# Patient Record
Sex: FEMALE | Race: WHITE | NOT HISPANIC OR LATINO | Employment: OTHER | URBAN - METROPOLITAN AREA
[De-identification: names, ages, dates, MRNs, and addresses within clinical notes are randomized per-mention and may not be internally consistent; named-entity substitution may affect disease eponyms.]

---

## 2022-08-12 ENCOUNTER — APPOINTMENT (OUTPATIENT)
Dept: CT IMAGING | Facility: HOSPITAL | Age: 32
End: 2022-08-12
Attending: EMERGENCY MEDICINE
Payer: MEDICAID

## 2022-08-12 ENCOUNTER — HOSPITAL ENCOUNTER (OUTPATIENT)
Facility: HOSPITAL | Age: 32
Setting detail: OBSERVATION
Discharge: HOME | End: 2022-08-13
Attending: EMERGENCY MEDICINE | Admitting: INTERNAL MEDICINE
Payer: MEDICAID

## 2022-08-12 ENCOUNTER — APPOINTMENT (OUTPATIENT)
Dept: MRI IMAGING | Facility: HOSPITAL | Age: 32
End: 2022-08-12
Payer: MEDICAID

## 2022-08-12 DIAGNOSIS — R53.1 WEAKNESS: ICD-10-CM

## 2022-08-12 DIAGNOSIS — U07.1 INFECTION DUE TO 2019 NOVEL CORONAVIRUS: ICD-10-CM

## 2022-08-12 DIAGNOSIS — R56.9 SEIZURES (H): ICD-10-CM

## 2022-08-12 DIAGNOSIS — R40.4 UNRESPONSIVE EPISODE: ICD-10-CM

## 2022-08-12 LAB
ALBUMIN SERPL-MCNC: 4 G/DL (ref 3.5–5)
ALP SERPL-CCNC: 86 U/L (ref 45–120)
ALT SERPL W P-5'-P-CCNC: 30 U/L (ref 0–45)
ANION GAP SERPL CALCULATED.3IONS-SCNC: 6 MMOL/L (ref 5–18)
APTT PPP: 27 SECONDS (ref 22–38)
AST SERPL W P-5'-P-CCNC: 23 U/L (ref 0–40)
BASOPHILS # BLD AUTO: 0 10E3/UL (ref 0–0.2)
BASOPHILS NFR BLD AUTO: 1 %
BILIRUB SERPL-MCNC: 1 MG/DL (ref 0–1)
BUN SERPL-MCNC: 15 MG/DL (ref 8–22)
CALCIUM SERPL-MCNC: 9 MG/DL (ref 8.5–10.5)
CHLORIDE BLD-SCNC: 106 MMOL/L (ref 98–107)
CO2 SERPL-SCNC: 26 MMOL/L (ref 22–31)
CREAT SERPL-MCNC: 0.77 MG/DL (ref 0.6–1.1)
EOSINOPHIL # BLD AUTO: 0 10E3/UL (ref 0–0.7)
EOSINOPHIL NFR BLD AUTO: 1 %
ERYTHROCYTE [DISTWIDTH] IN BLOOD BY AUTOMATED COUNT: 11.4 % (ref 10–15)
FLUAV RNA SPEC QL NAA+PROBE: NEGATIVE
FLUBV RNA RESP QL NAA+PROBE: NEGATIVE
GFR SERPL CREATININE-BSD FRML MDRD: >90 ML/MIN/1.73M2
GLUCOSE BLD-MCNC: 93 MG/DL (ref 70–125)
HCG SERPL QL: NEGATIVE
HCT VFR BLD AUTO: 38.8 % (ref 35–47)
HGB BLD-MCNC: 13.3 G/DL (ref 11.7–15.7)
IMM GRANULOCYTES # BLD: 0 10E3/UL
IMM GRANULOCYTES NFR BLD: 0 %
INR PPP: 1.09 (ref 0.85–1.15)
LYMPHOCYTES # BLD AUTO: 0.4 10E3/UL (ref 0.8–5.3)
LYMPHOCYTES NFR BLD AUTO: 10 %
MAGNESIUM SERPL-MCNC: 2 MG/DL (ref 1.8–2.6)
MCH RBC QN AUTO: 30 PG (ref 26.5–33)
MCHC RBC AUTO-ENTMCNC: 34.3 G/DL (ref 31.5–36.5)
MCV RBC AUTO: 87 FL (ref 78–100)
MONOCYTES # BLD AUTO: 0.4 10E3/UL (ref 0–1.3)
MONOCYTES NFR BLD AUTO: 9 %
NEUTROPHILS # BLD AUTO: 3.4 10E3/UL (ref 1.6–8.3)
NEUTROPHILS NFR BLD AUTO: 79 %
NRBC # BLD AUTO: 0 10E3/UL
NRBC BLD AUTO-RTO: 0 /100
PLATELET # BLD AUTO: 191 10E3/UL (ref 150–450)
POTASSIUM BLD-SCNC: 3.8 MMOL/L (ref 3.5–5)
PROT SERPL-MCNC: 7 G/DL (ref 6–8)
RBC # BLD AUTO: 4.44 10E6/UL (ref 3.8–5.2)
RSV RNA SPEC NAA+PROBE: NEGATIVE
SARS-COV-2 RNA RESP QL NAA+PROBE: POSITIVE
SODIUM SERPL-SCNC: 138 MMOL/L (ref 136–145)
TROPONIN I SERPL-MCNC: <0.01 NG/ML (ref 0–0.29)
TSH SERPL DL<=0.005 MIU/L-ACNC: 2.6 UIU/ML (ref 0.3–5)
WBC # BLD AUTO: 4.2 10E3/UL (ref 4–11)

## 2022-08-12 PROCEDURE — 99207 PR NO CHARGE LOS: CPT | Performed by: STUDENT IN AN ORGANIZED HEALTH CARE EDUCATION/TRAINING PROGRAM

## 2022-08-12 PROCEDURE — G0378 HOSPITAL OBSERVATION PER HR: HCPCS

## 2022-08-12 PROCEDURE — 99245 OFF/OP CONSLTJ NEW/EST HI 55: CPT | Performed by: PSYCHIATRY & NEUROLOGY

## 2022-08-12 PROCEDURE — 96361 HYDRATE IV INFUSION ADD-ON: CPT

## 2022-08-12 PROCEDURE — 96365 THER/PROPH/DIAG IV INF INIT: CPT

## 2022-08-12 PROCEDURE — 250N000013 HC RX MED GY IP 250 OP 250 PS 637: Performed by: PHYSICIAN ASSISTANT

## 2022-08-12 PROCEDURE — 99285 EMERGENCY DEPT VISIT HI MDM: CPT | Mod: CS,25

## 2022-08-12 PROCEDURE — C9803 HOPD COVID-19 SPEC COLLECT: HCPCS

## 2022-08-12 PROCEDURE — 70496 CT ANGIOGRAPHY HEAD: CPT

## 2022-08-12 PROCEDURE — 84443 ASSAY THYROID STIM HORMONE: CPT | Performed by: PHYSICIAN ASSISTANT

## 2022-08-12 PROCEDURE — 85025 COMPLETE CBC W/AUTO DIFF WBC: CPT | Performed by: PHYSICIAN ASSISTANT

## 2022-08-12 PROCEDURE — 70553 MRI BRAIN STEM W/O & W/DYE: CPT

## 2022-08-12 PROCEDURE — 250N000013 HC RX MED GY IP 250 OP 250 PS 637: Performed by: INTERNAL MEDICINE

## 2022-08-12 PROCEDURE — 36415 COLL VENOUS BLD VENIPUNCTURE: CPT | Performed by: EMERGENCY MEDICINE

## 2022-08-12 PROCEDURE — 255N000002 HC RX 255 OP 636: Performed by: EMERGENCY MEDICINE

## 2022-08-12 PROCEDURE — 250N000011 HC RX IP 250 OP 636: Performed by: EMERGENCY MEDICINE

## 2022-08-12 PROCEDURE — A9585 GADOBUTROL INJECTION: HCPCS | Performed by: EMERGENCY MEDICINE

## 2022-08-12 PROCEDURE — 84703 CHORIONIC GONADOTROPIN ASSAY: CPT | Performed by: PHYSICIAN ASSISTANT

## 2022-08-12 PROCEDURE — 80053 COMPREHEN METABOLIC PANEL: CPT | Performed by: PHYSICIAN ASSISTANT

## 2022-08-12 PROCEDURE — 85730 THROMBOPLASTIN TIME PARTIAL: CPT | Performed by: EMERGENCY MEDICINE

## 2022-08-12 PROCEDURE — 99207 PR NO BILLABLE SERVICE THIS VISIT: CPT | Performed by: NURSE PRACTITIONER

## 2022-08-12 PROCEDURE — 87637 SARSCOV2&INF A&B&RSV AMP PRB: CPT | Performed by: PHYSICIAN ASSISTANT

## 2022-08-12 PROCEDURE — 258N000003 HC RX IP 258 OP 636: Performed by: EMERGENCY MEDICINE

## 2022-08-12 PROCEDURE — 99220 PR INITIAL OBSERVATION CARE,LEVEL III: CPT | Performed by: INTERNAL MEDICINE

## 2022-08-12 PROCEDURE — 93005 ELECTROCARDIOGRAM TRACING: CPT | Performed by: EMERGENCY MEDICINE

## 2022-08-12 PROCEDURE — 83735 ASSAY OF MAGNESIUM: CPT | Performed by: PHYSICIAN ASSISTANT

## 2022-08-12 PROCEDURE — 85610 PROTHROMBIN TIME: CPT | Performed by: EMERGENCY MEDICINE

## 2022-08-12 PROCEDURE — 84484 ASSAY OF TROPONIN QUANT: CPT | Performed by: EMERGENCY MEDICINE

## 2022-08-12 PROCEDURE — 82040 ASSAY OF SERUM ALBUMIN: CPT | Performed by: PHYSICIAN ASSISTANT

## 2022-08-12 PROCEDURE — 258N000003 HC RX IP 258 OP 636: Performed by: PHYSICIAN ASSISTANT

## 2022-08-12 RX ORDER — ONDANSETRON 2 MG/ML
4 INJECTION INTRAMUSCULAR; INTRAVENOUS EVERY 6 HOURS PRN
Status: DISCONTINUED | OUTPATIENT
Start: 2022-08-12 | End: 2022-08-13 | Stop reason: HOSPADM

## 2022-08-12 RX ORDER — LEVETIRACETAM 500 MG/1
500 TABLET ORAL 2 TIMES DAILY
Status: DISCONTINUED | OUTPATIENT
Start: 2022-08-13 | End: 2022-08-13 | Stop reason: HOSPADM

## 2022-08-12 RX ORDER — ACETAMINOPHEN 325 MG/1
975 TABLET ORAL ONCE
Status: COMPLETED | OUTPATIENT
Start: 2022-08-12 | End: 2022-08-12

## 2022-08-12 RX ORDER — METHYLPHENIDATE HYDROCHLORIDE 36 MG/1
72 TABLET ORAL EVERY MORNING
COMMUNITY

## 2022-08-12 RX ORDER — METHYLPHENIDATE HYDROCHLORIDE 10 MG/1
10 CAPSULE, EXTENDED RELEASE ORAL
COMMUNITY

## 2022-08-12 RX ORDER — LIDOCAINE 40 MG/G
CREAM TOPICAL
Status: DISCONTINUED | OUTPATIENT
Start: 2022-08-12 | End: 2022-08-13 | Stop reason: HOSPADM

## 2022-08-12 RX ORDER — ACETAMINOPHEN 650 MG/1
650 SUPPOSITORY RECTAL EVERY 6 HOURS PRN
Status: DISCONTINUED | OUTPATIENT
Start: 2022-08-12 | End: 2022-08-13 | Stop reason: HOSPADM

## 2022-08-12 RX ORDER — ACETAMINOPHEN 325 MG/1
650 TABLET ORAL EVERY 6 HOURS PRN
Status: DISCONTINUED | OUTPATIENT
Start: 2022-08-12 | End: 2022-08-13 | Stop reason: HOSPADM

## 2022-08-12 RX ORDER — RISPERIDONE 2 MG/1
2 TABLET ORAL 3 TIMES DAILY
Status: DISCONTINUED | OUTPATIENT
Start: 2022-08-12 | End: 2022-08-13 | Stop reason: HOSPADM

## 2022-08-12 RX ORDER — AMOXICILLIN 250 MG
2 CAPSULE ORAL 2 TIMES DAILY PRN
Status: DISCONTINUED | OUTPATIENT
Start: 2022-08-12 | End: 2022-08-13 | Stop reason: HOSPADM

## 2022-08-12 RX ORDER — IBUPROFEN 600 MG/1
600 TABLET, FILM COATED ORAL ONCE
Status: COMPLETED | OUTPATIENT
Start: 2022-08-12 | End: 2022-08-12

## 2022-08-12 RX ORDER — GUANFACINE 1 MG/1
1 TABLET, EXTENDED RELEASE ORAL AT BEDTIME
Status: DISCONTINUED | OUTPATIENT
Start: 2022-08-13 | End: 2022-08-13 | Stop reason: HOSPADM

## 2022-08-12 RX ORDER — RISPERIDONE 1 MG/1
1 TABLET, ORALLY DISINTEGRATING ORAL DAILY PRN
COMMUNITY

## 2022-08-12 RX ORDER — ONDANSETRON 4 MG/1
4 TABLET, ORALLY DISINTEGRATING ORAL EVERY 6 HOURS PRN
Status: DISCONTINUED | OUTPATIENT
Start: 2022-08-12 | End: 2022-08-13 | Stop reason: HOSPADM

## 2022-08-12 RX ORDER — MIRTAZAPINE 15 MG/1
15 TABLET, FILM COATED ORAL
COMMUNITY

## 2022-08-12 RX ORDER — SODIUM CHLORIDE 9 MG/ML
INJECTION, SOLUTION INTRAVENOUS CONTINUOUS
Status: DISCONTINUED | OUTPATIENT
Start: 2022-08-12 | End: 2022-08-12

## 2022-08-12 RX ORDER — RISPERIDONE 2 MG/1
2 TABLET ORAL 3 TIMES DAILY
COMMUNITY

## 2022-08-12 RX ORDER — METHYLPHENIDATE HYDROCHLORIDE 10 MG/1
10 CAPSULE, EXTENDED RELEASE ORAL
Status: DISCONTINUED | OUTPATIENT
Start: 2022-08-13 | End: 2022-08-13 | Stop reason: HOSPADM

## 2022-08-12 RX ORDER — GADOBUTROL 604.72 MG/ML
6 INJECTION INTRAVENOUS ONCE
Status: COMPLETED | OUTPATIENT
Start: 2022-08-12 | End: 2022-08-12

## 2022-08-12 RX ORDER — RISPERIDONE 0.5 MG/1
1 TABLET, ORALLY DISINTEGRATING ORAL DAILY PRN
Status: DISCONTINUED | OUTPATIENT
Start: 2022-08-12 | End: 2022-08-13 | Stop reason: HOSPADM

## 2022-08-12 RX ORDER — MIRTAZAPINE 15 MG/1
15 TABLET, FILM COATED ORAL AT BEDTIME
Status: DISCONTINUED | OUTPATIENT
Start: 2022-08-12 | End: 2022-08-13 | Stop reason: HOSPADM

## 2022-08-12 RX ORDER — METHYLPHENIDATE HYDROCHLORIDE 18 MG/1
72 TABLET ORAL EVERY MORNING
Status: DISCONTINUED | OUTPATIENT
Start: 2022-08-13 | End: 2022-08-13 | Stop reason: HOSPADM

## 2022-08-12 RX ORDER — IOPAMIDOL 755 MG/ML
75 INJECTION, SOLUTION INTRAVASCULAR ONCE
Status: COMPLETED | OUTPATIENT
Start: 2022-08-12 | End: 2022-08-12

## 2022-08-12 RX ORDER — GUANFACINE 1 MG/1
1 TABLET, EXTENDED RELEASE ORAL AT BEDTIME
Status: DISCONTINUED | OUTPATIENT
Start: 2022-08-12 | End: 2022-08-12

## 2022-08-12 RX ORDER — AMOXICILLIN 250 MG
1 CAPSULE ORAL 2 TIMES DAILY PRN
Status: DISCONTINUED | OUTPATIENT
Start: 2022-08-12 | End: 2022-08-13 | Stop reason: HOSPADM

## 2022-08-12 RX ORDER — GUANFACINE 4 MG/1
4 TABLET, EXTENDED RELEASE ORAL DAILY
COMMUNITY

## 2022-08-12 RX ADMIN — IOPAMIDOL 75 ML: 755 INJECTION, SOLUTION INTRAVENOUS at 12:09

## 2022-08-12 RX ADMIN — RISPERIDONE 2 MG: 2 TABLET ORAL at 22:14

## 2022-08-12 RX ADMIN — ACETAMINOPHEN 975 MG: 325 TABLET ORAL at 13:45

## 2022-08-12 RX ADMIN — SODIUM CHLORIDE: 9 INJECTION, SOLUTION INTRAVENOUS at 13:43

## 2022-08-12 RX ADMIN — LEVETIRACETAM 1000 MG: 100 INJECTION, SOLUTION INTRAVENOUS at 18:03

## 2022-08-12 RX ADMIN — IBUPROFEN 600 MG: 600 TABLET ORAL at 15:52

## 2022-08-12 RX ADMIN — SODIUM CHLORIDE 1000 ML: 9 INJECTION, SOLUTION INTRAVENOUS at 12:46

## 2022-08-12 RX ADMIN — MIRTAZAPINE 15 MG: 15 TABLET, FILM COATED ORAL at 22:13

## 2022-08-12 RX ADMIN — GADOBUTROL 6 ML: 604.72 INJECTION INTRAVENOUS at 15:10

## 2022-08-12 ASSESSMENT — ACTIVITIES OF DAILY LIVING (ADL)
ADLS_ACUITY_SCORE: 35

## 2022-08-12 ASSESSMENT — ENCOUNTER SYMPTOMS
WEAKNESS: 1
TREMORS: 0
SPEECH DIFFICULTY: 1

## 2022-08-12 NOTE — ED TRIAGE NOTES
Ems to rm5.  Pt presents with inability to talk after having normal morning-ate b'fast and reading book while sitting in chair next to mom.  Pt with hx of fetal alcohol syndrome and developmental delay but normally can talk and interact normally.  BG enroute 106. Pt awake and interacting with staff shaking head yes or no to questions appropriately.  Mom to bs.  Provider to bs.  Teir 1 stroke code called due to inability to speak.  Mom reports pt has sibling in house just dx'd with home test last night covid +.  Denies fall or trauma.  Pt denies pain.  Mom reports pt slumped in chair into book and no fall or hitting head.  Mom also notes pt c/o scratchy throat after waking up this morning.      Triage Assessment     Row Name 08/12/22 1153       Triage Assessment (Adult)    Airway WDL WDL       Respiratory WDL    Respiratory WDL WDL       Skin Circulation/Temperature WDL    Skin Circulation/Temperature WDL WDL       Cardiac WDL    Cardiac WDL WDL       Peripheral/Neurovascular WDL    Peripheral Neurovascular WDL WDL       Cognitive/Neuro/Behavioral WDL    Cognitive/Neuro/Behavioral WDL speech    Speech unable to speak  pt normally able to verbalize but now not able to communicate with staff other than shaking head yes or  no.

## 2022-08-12 NOTE — H&P
ADMISSION HISTORY & PHYSICAL      No Ref-Primary, Physician, None  ASSESSMENT AND PLAN:  32 year old female with a history of fetal alcohol syndrome maker encephalopathy, ADD who presented for an episode of unresponsiveness.     Acute unresponsive episode   Code stroke called-complete work-up without evidence of stroke.  CTA head and neck, MRI brain negative   Neurology team evaluated and recommended Keppra dose loading as well as EEG   Concern for seizure     Fetal alcohol syndrome and developmental delay    COVID +   No interventions    Code Status: Full Code  DVT prophylaxis: SCD    Disposition:  -Anticipated Length of Stay in midnights and medical necessity (including a midnight in the Emergency Department after triage if applicable): 1-2 nights   -Discharge barriers: Neurology workup     Clinically Significant Risk Factors Present on Admission   Fetal alcohol syndrome                           CHIEF COMPLAINT:  Acute altered mental/unresponsive episode    HISTORY OF PRESENTING ILLNESS:  Patient is a 32 year old female with history significant for fetal alcohol syndrome maker encephalopathy, ADD who presented for an episode of unresponsiveness.  Prior to arrival patient was noted to be on the couch when she suddenly became limp and was unable to speak.  Family had difficulty arousing patient and called 911.  She was presented to the emergency department per EMS.  She was apparently nonverbal and had difficulty moving her extremities.  Weakness progressively improved but is still present.  On my evaluation, patient is able to speak and notes that she does not remember the episode.  She did not urinate herself or have a bowel movement.  Per report she may have had some shaking of her arm.  Patient notes a sore throat.  Family has had COVID recently.    PMH/PSH:  Patient Active Problem List   Diagnosis     Seizures (H)     Weakness     Unresponsive episode       ALLERGIES:  Allergies   Allergen Reactions     Sulfa  Drugs Rash       MEDICATIONS:  Reviewed.  Current Facility-Administered Medications   Medication     acetaminophen (TYLENOL) tablet 650 mg    Or     acetaminophen (TYLENOL) Suppository 650 mg     [START ON 8/13/2022] levETIRAcetam (KEPPRA) tablet 500 mg     lidocaine (LMX4) cream     lidocaine 1 % 0.1-1 mL     melatonin tablet 1 mg     ondansetron (ZOFRAN ODT) ODT tab 4 mg    Or     ondansetron (ZOFRAN) injection 4 mg     senna-docusate (SENOKOT-S/PERICOLACE) 8.6-50 MG per tablet 1 tablet    Or     senna-docusate (SENOKOT-S/PERICOLACE) 8.6-50 MG per tablet 2 tablet     sodium chloride (PF) 0.9% PF flush 3 mL     sodium chloride (PF) 0.9% PF flush 3 mL     Current Outpatient Medications   Medication     guanFACINE HCl (INTUNIV) 4 MG TB24     methylphenidate (CONCERTA) 36 MG CR tablet     methylphenidate (RITALIN LA) 10 MG 24 hr capsule     mirtazapine (REMERON) 15 MG tablet     risperiDONE (RISPERDAL M-TABS) 1 MG ODT     risperiDONE (RISPERDAL) 2 MG tablet       SOCIAL HISTORY:  Social History     Socioeconomic History     Marital status: Single     Spouse name: Not on file     Number of children: Not on file     Years of education: Not on file     Highest education level: Not on file   Occupational History     Not on file   Tobacco Use     Smoking status: Not on file     Smokeless tobacco: Not on file   Substance and Sexual Activity     Alcohol use: Not on file     Drug use: Not on file     Sexual activity: Not on file   Other Topics Concern     Not on file   Social History Narrative     Not on file     Social Determinants of Health     Financial Resource Strain: Not on file   Food Insecurity: Not on file   Transportation Needs: Not on file   Physical Activity: Not on file   Stress: Not on file   Social Connections: Not on file   Intimate Partner Violence: Not on file   Housing Stability: Not on file       FAMILY HISTORY:  History reviewed. No pertinent family history.      ROS:  12 point review of systems is  reviewed and is negative except for what has already been mention in the history of presenting illness.     PHYSICAL EXAM:  /76   Pulse 70   Temp 99.2  F (37.3  C) (Oral)   Resp 19   Wt 65.8 kg (145 lb)   SpO2 98%   I/O last 3 completed shifts:  In: 1000 [IV Piggyback:1000]  Out: -   I/O this shift:  In: 50 [IV Piggyback:50]  Out: 1000 [Urine:1000]  GENRL: Alert and answering questions appropriately. Not in acute distress. Lying in bed   HEENT: no JVP elevation, no lymphadenopathy or thyromegaly  CHEST: Clear to auscultation bilaterally. No wheezes, rhonchi or crackles. Breathing easily   HEART: Regular rate and rhythm, S1S2 auscultated. No murmurs  ABDMN: Soft. Non-tender, non-distended. No organomegaly. No guarding or rigidity. Bowel sounds present   EXTRM: No pedal edema, DP pulses 2+.   NEURO: Moving extremities and following commands.    PSYCH: flat affect and mood.   INTGM: No skin rash, no cyanosis or clubbing      DIAGNOSTIC DATA:  Recent Results (from the past 24 hour(s))   Comprehensive metabolic panel    Collection Time: 08/12/22 12:22 PM   Result Value Ref Range    Sodium 138 136 - 145 mmol/L    Potassium 3.8 3.5 - 5.0 mmol/L    Chloride 106 98 - 107 mmol/L    Carbon Dioxide (CO2) 26 22 - 31 mmol/L    Anion Gap 6 5 - 18 mmol/L    Urea Nitrogen 15 8 - 22 mg/dL    Creatinine 0.77 0.60 - 1.10 mg/dL    Calcium 9.0 8.5 - 10.5 mg/dL    Glucose 93 70 - 125 mg/dL    Alkaline Phosphatase 86 45 - 120 U/L    AST 23 0 - 40 U/L    ALT 30 0 - 45 U/L    Protein Total 7.0 6.0 - 8.0 g/dL    Albumin 4.0 3.5 - 5.0 g/dL    Bilirubin Total 1.0 0.0 - 1.0 mg/dL    GFR Estimate >90 >60 mL/min/1.73m2   Magnesium    Collection Time: 08/12/22 12:22 PM   Result Value Ref Range    Magnesium 2.0 1.8 - 2.6 mg/dL   TSH with free T4 reflex    Collection Time: 08/12/22 12:22 PM   Result Value Ref Range    TSH 2.60 0.30 - 5.00 uIU/mL   HCG qualitative Blood    Collection Time: 08/12/22 12:22 PM   Result Value Ref Range    hCG  Serum Qualitative Negative Negative   Partial thromboplastin time    Collection Time: 08/12/22 12:22 PM   Result Value Ref Range    aPTT 27 22 - 38 Seconds   INR    Collection Time: 08/12/22 12:22 PM   Result Value Ref Range    INR 1.09 0.85 - 1.15   Troponin I    Collection Time: 08/12/22 12:22 PM   Result Value Ref Range    Troponin I <0.01 0.00 - 0.29 ng/mL   CBC with platelets and differential    Collection Time: 08/12/22 12:22 PM   Result Value Ref Range    WBC Count 4.2 4.0 - 11.0 10e3/uL    RBC Count 4.44 3.80 - 5.20 10e6/uL    Hemoglobin 13.3 11.7 - 15.7 g/dL    Hematocrit 38.8 35.0 - 47.0 %    MCV 87 78 - 100 fL    MCH 30.0 26.5 - 33.0 pg    MCHC 34.3 31.5 - 36.5 g/dL    RDW 11.4 10.0 - 15.0 %    Platelet Count 191 150 - 450 10e3/uL    % Neutrophils 79 %    % Lymphocytes 10 %    % Monocytes 9 %    % Eosinophils 1 %    % Basophils 1 %    % Immature Granulocytes 0 %    NRBCs per 100 WBC 0 <1 /100    Absolute Neutrophils 3.4 1.6 - 8.3 10e3/uL    Absolute Lymphocytes 0.4 (L) 0.8 - 5.3 10e3/uL    Absolute Monocytes 0.4 0.0 - 1.3 10e3/uL    Absolute Eosinophils 0.0 0.0 - 0.7 10e3/uL    Absolute Basophils 0.0 0.0 - 0.2 10e3/uL    Absolute Immature Granulocytes 0.0 <=0.4 10e3/uL    Absolute NRBCs 0.0 10e3/uL   Symptomatic; Yes; 8/11/2022 Influenza A/B & SARS-CoV2 (COVID-19) Virus PCR Multiplex Nasopharyngeal    Collection Time: 08/12/22  3:52 PM    Specimen: Nasopharyngeal; Swab   Result Value Ref Range    Influenza A PCR Negative Negative    Influenza B PCR Negative Negative    RSV PCR Negative Negative    SARS CoV2 PCR Positive (A) Negative     All lab studies reviewed personally  Radiology report reviewed.      Meghna Hernandez DO  [facility]  Text page via [email or web address] Paging/Directory

## 2022-08-12 NOTE — CONSULTS
[facility]    Stroke Consult Note    Reason for Consult: Stroke Code     Chief Complaint: Syncope      HPI  Toshia Young is a 32 year old female with past medical history significant for fetal alcohol syndrome who was brought to the ED for evaluation of unresponsiveness. She was reportedly at baseline throughout the morning. At 1140, she was reading a book and suddenly lost consciousness and fell over. Her mom tested her oxygen levels, which were normal. EMS was immediately called. She started opening her eyes on EMS arrival, but was down for approximately 15 minutes. Her mom denies full body shaking but does note that her left arm had uncontrollable tremors for 5 minutes. She denies loss of bowel/bladder function. The patient's sister tested positive for COVID yesterday. She felt like her throat was scratchy today but has been otherwise well.     In the ED, her mom reports that her alertness has improved. She is able to follow single and multistep commands, however, speech is limited. She appears globally weak. Discussed with her mom that given initial imaging, left arm tremors, global weakness and improvement in exam, suspicion for stroke is low. We elected to proceed with MRI for further evaluation    Imaging Findings  HEAD CT: Normal head CT.     HEAD CTA: Normal CTA [facility].     NECK CTA: Normal neck CTA.    Intravenous Thrombolysis  Not given due to:   - minor/isolated/quickly resolving symptoms    Endovascular Treatment  Not initiated due to absence of proximal vessel occlusion    Impression   Loss of consciousness with subsequent encephalopathy. Witnessed left arm tremor while unresponsive. Now alert and with improving interaction. No focal weakness on exam and able to consistently follow multi-step commands, although verbal output remains limited. Concern for seizure.     Recommendations  - MRI brain w/wo. If stroke present, please page for further  "recommendations. If no stroke, recommend further evaluation per general neurology.    OPAL Alexander, CNP  Neurology  To page me or covering stroke neurology team member, click here: [facility]   Choose \"On Call\" tab at top, then search dropdown box for \"Neurology Adult\", select location, press Enter, then look for stroke/neuro ICU/telestroke.    ______________________________________________________    Clinically Significant Risk Factors Present on Admission                   Past Medical History   Past Medical History:   Diagnosis Date     Fetal alcohol syndrome      Past Surgical History   History reviewed. No pertinent surgical history.  Medications   Home Meds  Prior to Admission medications    Not on File       Scheduled Meds    sodium chloride 0.9%  1,000 mL Intravenous Once       Infusion Meds    sodium chloride         PRN Meds      Allergies   Allergies   Allergen Reactions     Sulfa Drugs Rash     Family History   History reviewed. No pertinent family history.  Social History        Review of Systems   Review of systems not obtained due to patient factors - confusion       PHYSICAL EXAMINATION  Pulse:  [79] 79  Resp:  [18] 18  BP: (121)/(72) 121/72  SpO2:  [98 %] 98 %     Neuro Exam  Mental Status:  alert but requires some encouragement for participation, states August for month, attempts to mouth other words but no verbal output, consistently follows since and multistep commands  Cranial Nerves:  visual fields intact (tested by nurse), EOMI with normal smooth pursuit, facial sensation intact and symmetric (tested by nurse), facial movements symmetric, hearing not formally tested but intact to conversation, shoulder shrug equal bilaterally  Motor:  no abnormal movements, able to move all limbs antigravity spontaneously with no signs of hemiparesis observed, drift to bed equal in all extremities  Reflexes:  unable to test (telestroke)  Sensory:  light touch sensation intact and symmetric throughout upper " and lower extremities (assessed by nurse), no extinction on double simultaneous stimulation (assessed by nurse)  Coordination:  no ataxia out of proportion to weakness  Station/Gait:  unable to test due to telestroke    Dysphagia Screen  Per Nursing    Stroke Scales    NIHSS  1a. Level of Consciousness 1-->Not alert, but arousable by minor stimulation to obey, answer, or respond   1b. LOC Questions 1-->Answers one question correctly   1c. LOC Commands 0-->Performs both tasks correctly   2.   Best Gaze 0-->Normal   3.   Visual 0-->No visual loss   4.   Facial Palsy 0-->Normal symmetrical movements   5a. Motor Arm, Left 2-->Some effort against gravity, limb cannot get to or maintain (if cued) 90 (or 45) degrees, drifts down to bed, but has some effort against gravity   5b. Motor Arm, Right 2-->Some effort against gravity, limb cannot get to or maintain (if cued) 90 (or 45) degrees, drifts down to bed, but has some effort against gravity   6a. Motor Leg, Left 2-->Some effort against gravity, leg falls to bed by 5 secs, but has some effort against gravity   6b. Motor Leg, right 2-->Some effort against gravity, leg falls to bed by 5 secs, but has some effort against gravity   7.   Limb Ataxia 0-->Absent   8.   Sensory 0-->Normal, no sensory loss   9.   Best Language 2-->Severe aphasia, all communication is through fragmentary expression, great need for inference, questioning, and guessing by the listener. Range of information that can be exchanged is limited, listener carries burden of. . . (see row details)   10. Dysarthria 1-->Mild-to-moderate dysarthria, patient slurs at least some words and, at worst, can be understood with some difficulty   11. Extinction and Inattention  0-->No abnormality   Total 13 (08/12/22 1351)       Imaging  I personally reviewed all imaging; relevant findings per HPI.     Lab Results Data   CBC  No results for input(s): WBC, RBC, HGB, HCT, PLT in the last 168 hours.  Basic Metabolic Panel     No results for input(s): NA, POTASSIUM, CHLORIDE, CO2, BUN, CR, GLC, TRISTAN in the last 168 hours.  Liver Panel  No results for input(s): PROTTOTAL, ALBUMIN, BILITOTAL, ALKPHOS, AST, ALT, BILIDIRECT in the last 168 hours.  INR  No lab results found.   Lipid Profile  No lab results found.  A1C  No lab results found.  Troponin I  No results for input(s): TROPONINIS, TROPONINI, GHTROP in the last 168 hours.       Stroke Code Data Data   Stroke Code Data  (for stroke code with tele)  Stroke code activated 08/12/22   1155   First stroke provider response 08/12/22   1157   Video start time 08/12/22   1230   Video end time 08/12/22   1248   Last known normal 08/12/22   1135   Time of discovery  (or onset of symptoms)  08/12/22   1140   Head CT read by Stroke Neuro Dr/Provider 08/12/22   1206   Was stroke code de-escalated? Yes 08/12/22 1250           Telestroke Service Details  Type of service telemedicine diagnostic assessment of acute neurological changes   Reason telemedicine is appropriate patient requires assessment with a specialist for diagnosis and treatment of neurological symptoms   Mode of transmission secure interactive audio and video communication per Ronan   Originating site (patient location) [facility]    Distant site (provider location) [facility]     Billing: I personally examined and evaluated the patient today. At the time of my evaluation and management the patient was critical condition today due to acute encephalopathy. I personally managed chart review, imaging review, examination. Key decisions made today included imaging review, exam and discussion with family. I spent a total of 70 minutes providing critical care services, evaluating the patient, directing care and reviewing laboratory values and radiologic reports.

## 2022-08-12 NOTE — ED PROVIDER NOTES
Emergency Department AURELIA Supervisory Note     I had a face to face encounter with this patient who was also seen by the AURELIA (PA / NP) Yehuda Alvares, who is currently serving as a AURELIA provider in the Emergency Department. I have seen, examined, and discussed the patient with the AURELIA and agree with their assessment and plan of management. Please refer to the AURELIA note for further details and ED course.     Brief HPI:     Toshia Young is a 32 year old female with history of fetal alcohol syndrome who presents for evaluation of syncope. Patient reports reading on the couch when she suddenly went limp (no shaking), and fell onto the floor. Family called 911. She is able to feel, but unable to speak. She has some movement of lower extremities, but can't lift them off the bed. She is attentive to words and understands what is being said to her.     Patient's sister, who she lives with, recently tested positive for COVID. Patient was expressing a sore throat bothering her today, and was otherwise fine.     I, Jorden Lerma, am serving as a scribe to document services personally performed by ED ALTAMIRANO MD, based on my observations and the provider's statements to me.   I, Ed Altamirano, attest that Jorden Lerma was acting in a scribe capacity, has observed my performance of the services and has documented them in accordance with my direction.    Brief Review of Systems:  Review of Systems   Neurological: Positive for syncope, speech difficulty and weakness. Negative for tremors.   All other systems reviewed and are negative.         Brief Physical Exam:  Physical Exam  Vitals and nursing note reviewed.   HENT:      Head: Normocephalic.      Nose: Nose normal.   Cardiovascular:      Rate and Rhythm: Normal rate.   Pulmonary:      Effort: Pulmonary effort is normal.   Neurological:      Mental Status: She is alert.      Comments: She seems to understand but does have expressive aphasia.   she does have movement in all  extremities, however with difficulty.         ED Course/MDM:  Toshia Young was staffed with me. I agree with their assessment and plan of management, and I will see the patient.  12:02 PM I met with the patient to introduce myself, gather additional history, perform my initial exam, and discuss the plan.   12:12 PM Spoke with stroke team    Given the time of onset and expressive aphasia this was initially started as a tier 1 stroke code.  CT CTA returned unremarkable.  Neuro stroke was involved early in this case and after CT CTA and evaluation the stroke code was de-escalated and she will be sent for an MRI for further evaluation of her symptoms.    At this point, given that she still does have neurodeficit the plan would be to admit with neurologic consultation.  MRI is pending.    She was signed out to Dr. Carlos Eugene at shift change.           Expressive aphasia    Labs and Imaging:  Results for orders placed or performed during the hospital encounter of 08/12/22   CTA Head Neck with Contrast    Impression    IMPRESSION:   HEAD CT: Normal head CT.    HEAD CTA: Normal CTA [place] [facility].    NECK CTA: Normal neck CTA.   Comprehensive metabolic panel   Result Value Ref Range    Sodium 138 136 - 145 mmol/L    Potassium 3.8 3.5 - 5.0 mmol/L    Chloride 106 98 - 107 mmol/L    Carbon Dioxide (CO2) 26 22 - 31 mmol/L    Anion Gap 6 5 - 18 mmol/L    Urea Nitrogen 15 8 - 22 mg/dL    Creatinine 0.77 0.60 - 1.10 mg/dL    Calcium 9.0 8.5 - 10.5 mg/dL    Glucose 93 70 - 125 mg/dL    Alkaline Phosphatase 86 45 - 120 U/L    AST 23 0 - 40 U/L    ALT 30 0 - 45 U/L    Protein Total 7.0 6.0 - 8.0 g/dL    Albumin 4.0 3.5 - 5.0 g/dL    Bilirubin Total 1.0 0.0 - 1.0 mg/dL    GFR Estimate >90 >60 mL/min/1.73m2   Result Value Ref Range    Magnesium 2.0 1.8 - 2.6 mg/dL   TSH with free T4 reflex   Result Value Ref Range    TSH 2.60 0.30 - 5.00 uIU/mL   HCG qualitative Blood   Result Value Ref Range    hCG Serum Qualitative Negative  Negative   Partial thromboplastin time   Result Value Ref Range    aPTT 27 22 - 38 Seconds   Result Value Ref Range    INR 1.09 0.85 - 1.15   Result Value Ref Range    Troponin I <0.01 0.00 - 0.29 ng/mL   CBC with platelets and differential   Result Value Ref Range    WBC Count 4.2 4.0 - 11.0 10e3/uL    RBC Count 4.44 3.80 - 5.20 10e6/uL    Hemoglobin 13.3 11.7 - 15.7 g/dL    Hematocrit 38.8 35.0 - 47.0 %    MCV 87 78 - 100 fL    MCH 30.0 26.5 - 33.0 pg    MCHC 34.3 31.5 - 36.5 g/dL    RDW 11.4 10.0 - 15.0 %    Platelet Count 191 150 - 450 10e3/uL    % Neutrophils 79 %    % Lymphocytes 10 %    % Monocytes 9 %    % Eosinophils 1 %    % Basophils 1 %    % Immature Granulocytes 0 %    NRBCs per 100 WBC 0 <1 /100    Absolute Neutrophils 3.4 1.6 - 8.3 10e3/uL    Absolute Lymphocytes 0.4 (L) 0.8 - 5.3 10e3/uL    Absolute Monocytes 0.4 0.0 - 1.3 10e3/uL    Absolute Eosinophils 0.0 0.0 - 0.7 10e3/uL    Absolute Basophils 0.0 0.0 - 0.2 10e3/uL    Absolute Immature Granulocytes 0.0 <=0.4 10e3/uL    Absolute NRBCs 0.0 10e3/uL     I have reviewed the relevant laboratory and radiology studies      Alex Urrutia MD  [facility] EMERGENCY DEPARTMENT  [facility] [facility] [address] [phone]         Alex Urrutia MD  08/12/22 7880

## 2022-08-12 NOTE — ED NOTES
Bed: JNED-05  Expected date: 8/12/22  Expected time: 11:45 AM  Means of arrival: Ambulance  Comments:  MPWD- 33yo F Unresponsive episode, now A&O

## 2022-08-12 NOTE — PHARMACY-ADMISSION MEDICATION HISTORY
Pharmacy Note - Admission Medication History    Pertinent Provider Information:      ______________________________________________________________________    Prior To Admission (PTA) med list completed and updated in EMR.       PTA Med List   Medication Sig Last Dose     guanFACINE HCl (INTUNIV) 4 MG TB24 Take 1 mg by mouth At Bedtime 8/12/2022 at am     methylphenidate (CONCERTA) 36 MG CR tablet Take 72 mg by mouth every morning 8/12/2022 at am     methylphenidate (RITALIN LA) 10 MG 24 hr capsule Take 10 mg by mouth daily (with lunch) At noon 8/11/2022 at noon     mirtazapine (REMERON) 15 MG tablet Take 15 mg by mouth At Bedtime 8/11/2022 at Unknown time     risperiDONE (RISPERDAL M-TABS) 1 MG ODT Place 1 mg under the tongue daily as needed (agitation)      risperiDONE (RISPERDAL) 2 MG tablet Take 2 mg by mouth 3 times daily 8/12/2022 at x1       Information source(s): Family member and Clinic records  Method of interview communication: phone    Summary of Changes to PTA Med List  New: all medications  Discontinued: none  Changed: none    Patient was asked about OTC/herbal products specifically.  PTA med list reflects this.    In the past week, patient estimated taking medication this percent of the time:  greater than 90%.    Allergies were reviewed, assessed, and updated with the patient.      Patient does not use any multi-dose medications prior to admission.    The information provided in this note is only as accurate as the sources available at the time of the update(s).    Thank you for the opportunity to participate in the care of this patient.    Estee Wetzel NAGI  8/12/2022 4:42 PM

## 2022-08-12 NOTE — ED PROVIDER NOTES
EMERGENCY DEPARTMENT ENCOUNTER      NAME: Toshia Young  AGE: 32 year old female  YOB: 1990  MRN: [id number]  EVALUATION DATE & TIME: 8/12/2022 11:46 AM    PCP: No primary care provider on file.    ED PROVIDER: Yehuda Alvares PA-C      Chief Complaint   Patient presents with     Syncope         FINAL IMPRESSION:  1. Seizures (H)    2. Unresponsive episode    3. Weakness          MEDICAL DECISION MAKING:    Pertinent Labs & Imaging studies reviewed. (See chart for details)  32 year old female presents to the Emergency Department for evaluation of syncopal/unresponsive episode now presenting with nonverbal status and inability to move arms and legs.    After my initial assessment and interview with the mother I did initiate stroke code immediately.  I also discussed the case with attending physician .  Also discussed case with stroke team and they are in agreement for now.    After the CT CTA head completed and stroke team had performed her assessment we did de-escalate stroke code.  I did feel strongly that patient's symptoms likely were a first-time seizure.  Apparently the mother did report to the stroke team that about 5 minutes after she syncopized that there was left-sided arm and leg shaking and trembling.    Screening labs have all been unremarkable.  Patient just returned from MRI and reassessment it does show that she is now speaking and able to move arms and legs.  Vital signs remained stable.  We will attempt to ambulate her and have her stand up to see how she does as we await the MRI results.    Patient was able to stand at the bedside however she reported her legs feeling quite weak, and she was shaking so they did sit her back down.  She has not eaten all day therefore we will give her a sandwich while she awaits the MRI results.  If her ability to ambulate does not improve we will likely need to admit the patient for overnight observation.    MRI resulted as unremarkable.   Again based on the patient's difficulty standing secondary to weakness I do not feel that she is safe to discharge to home.  I discussed this plan with her father over the telephone and they are agreeable for observational admission.  I will discussed the case with the hospitalist group, neurologic consultation can occur based on their discretion.  Working diagnosis at this time would be seizure episode.    ED COURSE  11:50 AM I met with the patient, obtained history, performed an initial exam, and discussed options and plan for diagnostics and treatment here in the ED.  11:53 AM Tier 1 stroke code initiated.   11:55 AM I staffed the patient with my colleague Dr. Urrutia who will see the patient. Dr. Urrutia was kept up to date on patient throughout ED course.   11:58 AM Spoke with the stroke team.   12:22 PM Discussed the imaging results with the stroke team.   12:23 PM Rechecked the patient and discussed further with the stroke neurologist.   12:49 PM Spoke with stroke neurology. Stroke code de-escalated. Will proceed with MRI with and without contrast.   3:29 PM Rechecked the patient.   4:50 PM I spoke with Dr. Lee, neurology.  5:13 PM recommendations for Keppra loading.  At the conclusion of the encounter I discussed the results of all of the tests and the disposition. The questions were answered. The patient or family acknowledged understanding and was agreeable with the care plan.     PPE worn: n95 mask, goggles.     MEDICATIONS GIVEN IN THE EMERGENCY:  Medications   levETIRAcetam (KEPPRA) tablet 500 mg (has no administration in time range)   lidocaine 1 % 0.1-1 mL (has no administration in time range)   lidocaine (LMX4) cream (has no administration in time range)   sodium chloride (PF) 0.9% PF flush 3 mL (3 mLs Intracatheter Not Given 8/12/22 2111)   sodium chloride (PF) 0.9% PF flush 3 mL (has no administration in time range)   acetaminophen (TYLENOL) tablet 650 mg (has no administration in time range)      Or   acetaminophen (TYLENOL) Suppository 650 mg (has no administration in time range)   melatonin tablet 1 mg (has no administration in time range)   ondansetron (ZOFRAN ODT) ODT tab 4 mg (has no administration in time range)     Or   ondansetron (ZOFRAN) injection 4 mg (has no administration in time range)   senna-docusate (SENOKOT-S/PERICOLACE) 8.6-50 MG per tablet 1 tablet (has no administration in time range)     Or   senna-docusate (SENOKOT-S/PERICOLACE) 8.6-50 MG per tablet 2 tablet (has no administration in time range)   mirtazapine (REMERON) tablet 15 mg (15 mg Oral Given 8/12/22 2213)   risperiDONE (risperDAL M-TABS) ODT tab 1 mg (has no administration in time range)   risperiDONE (risperDAL) tablet 2 mg (2 mg Oral Given 8/12/22 2214)   methylphenidate HCl ER (CONCERTA) CR tablet 72 mg (has no administration in time range)   methylphenidate (RITALIN LA) CP24 10 mg (has no administration in time range)   guanFACINE (INTUNIV) 24 hr tablet 1 mg (has no administration in time range)   0.9% sodium chloride BOLUS (0 mLs Intravenous Stopped 8/12/22 1343)   iopamidol (ISOVUE-370) solution 75 mL (75 mLs Intravenous Given 8/12/22 1209)   acetaminophen (TYLENOL) tablet 975 mg (975 mg Oral Given 8/12/22 1345)   gadobutrol (GADAVIST) injection 6 mL (6 mLs Intravenous Given 8/12/22 1510)   ibuprofen (ADVIL/MOTRIN) tablet 600 mg (600 mg Oral Given 8/12/22 1552)   levETIRAcetam (KEPPRA) 1,000 mg in sodium chloride 0.9 % 100 mL intermittent infusion (0 mg Intravenous Stopped 8/12/22 1829)       NEW PRESCRIPTIONS STARTED AT TODAY'S ER VISIT  Current Discharge Medication List               =================================================================    HPI    Patient information was obtained from: EMS, family  HPI limited: Mental status change    Use of Interpretor: N/A       Toshia MATHIS Hector is a 32 year old female with a pertinent history of fetal alcohol syndrome who presents to this ED via EMS for evaluation of a  syncopal episode and altered mental status.     Patient comes in after a witnessed syncopal episode and mentation change which occurred about 20 minutes prior to arrival this morning (8/12/2022). The patient was sitting in a chair reading when all of a sudden she slumped forward and syncopized. Mom was sitting right next to the patient when this occurred. There is no obvious trauma. Mom states there was no shaking or obvious seizure like activity. Since the episode, patient has been awake and nonverbal but will nod to yes or no questions. Mom states this is abnormal, noting that she is usually quite talkative at baseline.the patient appears to be weak in all 4 extremities. Of note, the patient was complaining of a slight sore throat and cough this morning. Patient's sister, who lives in the same household, currently is positive for covid.     Of note, stroke neurology interview mother stated that shortly after the syncopal episode the patient had a 5 minute episode of uncontrollable shaking of the left arm and left leg.       REVIEW OF SYSTEMS   Review of Systems   Unable to perform ROS: Mental status change        PAST MEDICAL HISTORY:  Past Medical History:   Diagnosis Date     Fetal alcohol syndrome        PAST SURGICAL HISTORY:  History reviewed. No pertinent surgical history.      CURRENT MEDICATIONS:      Current Facility-Administered Medications:      acetaminophen (TYLENOL) tablet 650 mg, 650 mg, Oral, Q6H PRN **OR** acetaminophen (TYLENOL) Suppository 650 mg, 650 mg, Rectal, Q6H PRN, Meghna Hernandez DO     guanFACINE (INTUNIV) 24 hr tablet 1 mg, 1 mg, Oral, At Bedtime, Meghna Hernandez DO     levETIRAcetam (KEPPRA) tablet 500 mg, 500 mg, Oral, BID, Leighton Castro MD     lidocaine (LMX4) cream, , Topical, Q1H PRN, Meghna Hernandez DO     lidocaine 1 % 0.1-1 mL, 0.1-1 mL, Other, Q1H PRN, Meghna Hernandez DO     melatonin tablet 1 mg, 1 mg, Oral, At Bedtime  PRN, Meghna Hernandez DO     methylphenidate (RITALIN LA) CP24 10 mg, 10 mg, Oral, Daily with lunch, Meghna Hernandez DO     methylphenidate HCl ER (CONCERTA) CR tablet 72 mg, 72 mg, Oral, QAM, Meghna Hernandez DO     mirtazapine (REMERON) tablet 15 mg, 15 mg, Oral, At Bedtime, Meghna Hernandez DO, 15 mg at 08/12/22 2213     ondansetron (ZOFRAN ODT) ODT tab 4 mg, 4 mg, Oral, Q6H PRN **OR** ondansetron (ZOFRAN) injection 4 mg, 4 mg, Intravenous, Q6H PRN, Meghna Hernandez DO     risperiDONE (risperDAL M-TABS) ODT tab 1 mg, 1 mg, Sublingual, Daily PRN, Meghna Hernandez DO     risperiDONE (risperDAL) tablet 2 mg, 2 mg, Oral, TID, Meghna Hernandez DO, 2 mg at 08/12/22 2214     senna-docusate (SENOKOT-S/PERICOLACE) 8.6-50 MG per tablet 1 tablet, 1 tablet, Oral, BID PRN **OR** senna-docusate (SENOKOT-S/PERICOLACE) 8.6-50 MG per tablet 2 tablet, 2 tablet, Oral, BID PRN, Meghna Hernandez DO     sodium chloride (PF) 0.9% PF flush 3 mL, 3 mL, Intracatheter, Q8H, Meghna Hernandez DO     sodium chloride (PF) 0.9% PF flush 3 mL, 3 mL, Intracatheter, q1 min prn, Meghna Hernandez DO      ALLERGIES:  Allergies   Allergen Reactions     Sulfa Drugs Rash       FAMILY HISTORY:  History reviewed. No pertinent family history.    SOCIAL HISTORY:   Social History     Socioeconomic History     Marital status: Single       VITALS:  Patient Vitals for the past 24 hrs:   BP Temp Temp src Pulse Resp SpO2 Weight   08/13/22 0420 109/63 98.9  F (37.2  C) Oral 80 16 96 % --   08/13/22 0115 109/61 97.9  F (36.6  C) Oral 75 18 96 % --   08/12/22 1941 105/71 98.5  F (36.9  C) Oral 71 18 96 % --   08/12/22 1845 119/76 -- -- 70 19 98 % --   08/12/22 1830 120/72 -- -- 63 19 97 % --   08/12/22 1815 115/69 -- -- 68 22 97 % --   08/12/22 1800 124/78 -- -- 70 27 98 % --   08/12/22 1745 111/67 -- -- 64 22 98 % --   08/12/22 1730 118/71 -- -- 68 28 98 % --    08/12/22 1715 121/74 -- -- 67 25 98 % --   08/12/22 1638 -- -- -- 78 25 98 % --   08/12/22 1615 125/77 -- -- 79 18 99 % --   08/12/22 1600 118/65 -- -- 72 20 98 % --   08/12/22 1530 127/77 -- -- 83 -- 98 % --   08/12/22 1418 -- -- -- -- -- -- 65.8 kg (145 lb)   08/12/22 1400 126/79 -- -- 78 16 98 % --   08/12/22 1345 128/79 -- -- 84 19 99 % --   08/12/22 1330 125/76 -- -- 67 16 98 % --   08/12/22 1323 -- 99.2  F (37.3  C) Oral -- -- -- --   08/12/22 1315 (!) 152/97 -- -- 92 28 -- --   08/12/22 1300 (!) 145/89 -- -- 80 19 -- --   08/12/22 1245 126/71 -- -- 69 16 -- --   08/12/22 1241 -- -- -- 79 21 -- --   08/12/22 1230 (!) 141/78 -- -- 78 19 -- --   08/12/22 1215 113/59 -- -- 68 16 99 % --   08/12/22 1151 121/72 -- -- 79 18 98 % 65.8 kg (145 lb)       PHYSICAL EXAM    Physical Exam  Vitals and nursing note reviewed.   Constitutional:       Appearance: She is normal weight. She is not toxic-appearing or diaphoretic.   HENT:      Head: Normocephalic.      Right Ear: External ear normal.      Left Ear: External ear normal.      Mouth/Throat:      Mouth: Mucous membranes are moist.   Eyes:      Extraocular Movements: Extraocular movements intact.      Conjunctiva/sclera: Conjunctivae normal.      Pupils: Pupils are equal, round, and reactive to light.   Cardiovascular:      Rate and Rhythm: Normal rate.      Pulses: Normal pulses.   Pulmonary:      Effort: Pulmonary effort is normal. No respiratory distress.      Breath sounds: No stridor. No wheezing.   Abdominal:      General: Abdomen is flat.      Tenderness: There is no abdominal tenderness. There is no guarding or rebound.   Musculoskeletal:         General: No tenderness, deformity or signs of injury.      Cervical back: Normal range of motion.      Right lower leg: No edema.      Left lower leg: No edema.   Skin:     General: Skin is warm and dry.      Findings: No rash.   Neurological:      Mental Status: She is alert.      Comments: Patient is alert and awake  tracking with her eyes.  She will nod subtly with her head to answer my questions but otherwise she will not speak.  She does have some movement in the arms and legs but really cannot lift them and her  strength is weak bilaterally.  When assessing her sensory function she does acknowledge that she feels me touch her lower extremities.  Cranial nerves cannot be fully tested as she will not follow these commands she will not smile, speak, or follow these commands.  NIH score calculated at 13 please see documentation below.   Psychiatric:         Mood and Affect: Mood normal.          [employer] Stroke Scale  Exam Interval: Baseline   Score    Level of consciousness: (0)   Alert, keenly responsive    LOC questions: (2)   Answers neither question correctly    LOC commands: (2)   Performs neither task correctly    Best gaze: (0)   Normal    Visual: (0)   No visual loss    Facial palsy: (2)   Partial paralysis (total/near total of lower face)    Motor arm (left): (2)   Some effort against gravity    Motor arm (right): (2)   Some effort against gravity    Motor leg (left): (2)   Some effort against gravity    Motor leg (right): (2)   Some effort against gravity    Limb ataxia: (2)   Present in two limbs    Sensory: (0)   Normal- no sensory loss    Best language: (3)   Mute- global aphasia    Dysarthria: (2)   Severe dysarthria    Extinction and inattention: (0)   No abnormality        Total Score:  13         LAB:  All pertinent labs reviewed and interpreted.  Results for orders placed or performed during the hospital encounter of 08/12/22   CTA Head Neck with Contrast    Impression    IMPRESSION:   HEAD CT: Normal head CT.    HEAD CTA: Normal CTA [place].    NECK CTA: Normal neck CTA.   MR Brain w/o & w Contrast    Impression    IMPRESSION:  1.  No epileptogenic focus identified.  2.  No acute intracranial process.   Comprehensive metabolic panel   Result Value Ref Range    Sodium 138 136  - 145 mmol/L    Potassium 3.8 3.5 - 5.0 mmol/L    Chloride 106 98 - 107 mmol/L    Carbon Dioxide (CO2) 26 22 - 31 mmol/L    Anion Gap 6 5 - 18 mmol/L    Urea Nitrogen 15 8 - 22 mg/dL    Creatinine 0.77 0.60 - 1.10 mg/dL    Calcium 9.0 8.5 - 10.5 mg/dL    Glucose 93 70 - 125 mg/dL    Alkaline Phosphatase 86 45 - 120 U/L    AST 23 0 - 40 U/L    ALT 30 0 - 45 U/L    Protein Total 7.0 6.0 - 8.0 g/dL    Albumin 4.0 3.5 - 5.0 g/dL    Bilirubin Total 1.0 0.0 - 1.0 mg/dL    GFR Estimate >90 >60 mL/min/1.73m2   Result Value Ref Range    Magnesium 2.0 1.8 - 2.6 mg/dL   TSH with free T4 reflex   Result Value Ref Range    TSH 2.60 0.30 - 5.00 uIU/mL   HCG qualitative Blood   Result Value Ref Range    hCG Serum Qualitative Negative Negative   Partial thromboplastin time   Result Value Ref Range    aPTT 27 22 - 38 Seconds   Result Value Ref Range    INR 1.09 0.85 - 1.15   Result Value Ref Range    Troponin I <0.01 0.00 - 0.29 ng/mL   CBC with platelets and differential   Result Value Ref Range    WBC Count 4.2 4.0 - 11.0 10e3/uL    RBC Count 4.44 3.80 - 5.20 10e6/uL    Hemoglobin 13.3 11.7 - 15.7 g/dL    Hematocrit 38.8 35.0 - 47.0 %    MCV 87 78 - 100 fL    MCH 30.0 26.5 - 33.0 pg    MCHC 34.3 31.5 - 36.5 g/dL    RDW 11.4 10.0 - 15.0 %    Platelet Count 191 150 - 450 10e3/uL    % Neutrophils 79 %    % Lymphocytes 10 %    % Monocytes 9 %    % Eosinophils 1 %    % Basophils 1 %    % Immature Granulocytes 0 %    NRBCs per 100 WBC 0 <1 /100    Absolute Neutrophils 3.4 1.6 - 8.3 10e3/uL    Absolute Lymphocytes 0.4 (L) 0.8 - 5.3 10e3/uL    Absolute Monocytes 0.4 0.0 - 1.3 10e3/uL    Absolute Eosinophils 0.0 0.0 - 0.7 10e3/uL    Absolute Basophils 0.0 0.0 - 0.2 10e3/uL    Absolute Immature Granulocytes 0.0 <=0.4 10e3/uL    Absolute NRBCs 0.0 10e3/uL   Symptomatic; Yes; 8/11/2022 Influenza A/B & SARS-CoV2 (COVID-19) Virus PCR Multiplex Nasopharyngeal    Specimen: Nasopharyngeal; Swab   Result Value Ref Range    Influenza A PCR Negative  Negative    Influenza B PCR Negative Negative    RSV PCR Negative Negative    SARS CoV2 PCR Positive (A) Negative   Comprehensive metabolic panel   Result Value Ref Range    Sodium 139 136 - 145 mmol/L    Potassium 4.0 3.5 - 5.0 mmol/L    Chloride 108 (H) 98 - 107 mmol/L    Carbon Dioxide (CO2) 23 22 - 31 mmol/L    Anion Gap 8 5 - 18 mmol/L    Urea Nitrogen 11 8 - 22 mg/dL    Creatinine 0.79 0.60 - 1.10 mg/dL    Calcium 8.9 8.5 - 10.5 mg/dL    Glucose 76 70 - 125 mg/dL    Alkaline Phosphatase 90 45 - 120 U/L    AST 21 0 - 40 U/L    ALT 25 0 - 45 U/L    Protein Total 7.4 6.0 - 8.0 g/dL    Albumin 3.9 3.5 - 5.0 g/dL    Bilirubin Total 0.7 0.0 - 1.0 mg/dL    GFR Estimate >90 >60 mL/min/1.73m2   CBC with platelets and differential   Result Value Ref Range    WBC Count 5.3 4.0 - 11.0 10e3/uL    RBC Count 4.77 3.80 - 5.20 10e6/uL    Hemoglobin 14.4 11.7 - 15.7 g/dL    Hematocrit 42.3 35.0 - 47.0 %    MCV 89 78 - 100 fL    MCH 30.2 26.5 - 33.0 pg    MCHC 34.0 31.5 - 36.5 g/dL    RDW 11.7 10.0 - 15.0 %    Platelet Count 188 150 - 450 10e3/uL    % Neutrophils 69 %    % Lymphocytes 17 %    % Monocytes 14 %    % Eosinophils 0 %    % Basophils 0 %    % Immature Granulocytes 0 %    NRBCs per 100 WBC 0 <1 /100    Absolute Neutrophils 3.6 1.6 - 8.3 10e3/uL    Absolute Lymphocytes 0.9 0.8 - 5.3 10e3/uL    Absolute Monocytes 0.7 0.0 - 1.3 10e3/uL    Absolute Eosinophils 0.0 0.0 - 0.7 10e3/uL    Absolute Basophils 0.0 0.0 - 0.2 10e3/uL    Absolute Immature Granulocytes 0.0 <=0.4 10e3/uL    Absolute NRBCs 0.0 10e3/uL       RADIOLOGY:  Reviewed all pertinent imaging. Please see official radiology report.  MR Brain w/o & w Contrast   Final Result   IMPRESSION:   1.  No epileptogenic focus identified.   2.  No acute intracranial process.      CTA Head Neck with Contrast   Final Result   IMPRESSION:    HEAD CT: Normal head CT.      HEAD CTA: Normal CTA [place].      NECK CTA: Normal neck CTA.          EKG:    Performed at: EKG  showing sinus rhythm at a rate of 82 bpm.  The ST segments appear normal with no acute elevation or depression.  T waves are upright.  QTC measured at 446.    I have independently reviewed and interpreted the EKG(s) documented above.  Reviewed with Dr. Ghada CAMEJO, Trell Hawthorne, am serving as a scribe to document services personally performed by Yehuda Alvares PA-C based on my observation and the provider's statements to me. I, Yehuda Alvares PA-C attest that Trell Hawthorne is acting in a scribe capacity, has observed my performance of the services and has documented them in accordance with my direction.    Yehuda Alvares PA-C  Emergency Medicine  [facility]     Yehuda Alvares PA-C  08/13/22 0861

## 2022-08-12 NOTE — ED NOTES
Pt resting.  More interactive with staff.  Rolls for placement of chux and diaper and purewick with good bilat strength.  Mom at bs.  Vss.  pcxr done.

## 2022-08-12 NOTE — ED NOTES
[facility] ED Handoff Report    ED Chief Complaint: syncopal/seizure activity    ED Diagnosis:  (R56.9) Seizures (H)  Comment: No previous history  Plan: 1st dose of Keppra given    (R41.89) Unresponsive episode  Comment: Patient was reading book when episode occurred.   Plan:     (R53.1) Weakness  Comment: Atempted to ambulate patient without success.  Was able to only take a couple steps.  Plan:        PMH:    Past Medical History:   Diagnosis Date     Fetal alcohol syndrome         Code Status:  No Order     Falls Risk: Yes Band: Applied    Current Living Situation/Residence: lives in a house     Elimination Status: Continent: Yes -on [employer]    Activity Level: 2 assist    Patients Preferred Language:  English     Needed: No    Vital Signs:  /69   Pulse 68   Temp 99.2  F (37.3  C) (Oral)   Resp 22   Wt 65.8 kg (145 lb)   SpO2 97%      Cardiac Rhythm: sinus rhythm irregular    Pain Score: 0/10    Is the Patient Confused:  No    Last Food or Drink: 08/12/22     Focused Assessment:  Neuro exam normal other than generalized weakness.  Patient reports she did not remember reading her book prior to episode.  Was what appeared to be post ictal upon arrival.  Stroke code was called    Tests Performed: Done: Labs and Imaging    Treatments Provided:  SEE MAR    Family Dynamics/Concerns: No    Family Updated On Visitor Policy: Yes    Plan of Care Communicated to Family: Yes    Who Was Updated about Plan of Care: Mother who is her legal guardian    Belongings Checklist Done and Signed by Patient: Yes    Medications sent with patient: N/A    Covid: symptomatic, positive    Additional Information: Patient sister is also covid positive.  Mother is aware of policy but would like exception as she is the patient legal guardian.  Patient is MR and has Fetal alcohol syndrome.  Patient answers questions appropriately, but does look to her mother for reassurance.      RN: Kathe White RN   8/12/2022  6:40 PM

## 2022-08-12 NOTE — CONSULTS
"This is a neurological consultation    32-year-old admitted to hospital 8/12/2022    Chief complaint  Loss of consciousness increased confusion    HPI  32-year-old with history of fetal alcohol syndrome.  Brought to the hospital on 8/12/2022 with report that she was just reading on the couch suddenly became limp.  Patient fell to the floor and family called 911  Patient was unable to speak  Immediate been some movement in the lower extremities but could not lift them off the bed  Per reports that the patient was able to understand some words    Patient was in the ER for quite some time over 5 hours and slowly improved over that time.  Initially had inability to talk  You have had some mild scratchy throat recently but no severe recent illness    At baseline usually talks and interacts normally but does have some developmental delay    Per mother when EMS arrived she was opening her eyes but may have been down for at least 15 minutes  There were some reports that there was some uncontrolled tremors of her left arm for \"5 minutes\"  No bowel or bladder loss    Initially Case run by stroke code neurology    Patient is on multiple meds at home  Concerta for ADD  Mirtazapine/Risperdal for behavior      Past medical history  Fetal alcohol syndrome with developmental delay (verbal ambulatory at baseline)  Microcephaly  ADD  Behavioral difficulties    Habits  Non-smoker  Does not drink alcohol  Likes to knit    Family history  Noncontributory      Work-up  HEAD CT: Normal head CT.  8/12/2022  HEAD CTA: Normal CTA [place] [facility].  8/12/2022  NECK CTA: Normal neck CTA.  8/12/2022  MRI head with and without contrast 8/12/2022  1.  No epileptogenic focus identified.  2.  No acute intracranial process.  Pregnancy test negative  Troponin on admission less than 0.01  TSH 2.6  SARS COVID test positive  Influenza A and influenza B and respiratory syncytial virus negative    Laboratory data  Sodium 138 potassium 3.8  BUN 15 " "creatinine 0.77  AST 23/ALT 30  Glucose 93  White blood count 4.2, hemoglobin 13.3, platelets 191,000          Exam  Blood pressure 115/69, pulse 68, temperature 99.2  Blood pressure range 111//97  Pulse range 64-92  T-max 99.2    Review of systems  Currently amnestic for the events of today    No headache no chest pain or shortness of breath no nausea vomiting no diarrhea no fever chills    No diplopia dysarthria dysphagia  No visual field cut  Feeling better    Otherwise review systems negative      General exam per primary MD  Alert and attentive  HEENT no signs of trauma  Lungs clear  Heart rate regular  Abdomen soft  Symmetrical pulses  No edema in the feet    Neurologic exam  Alert attentive  Normal prosody of speech  Normal naming  Normal comprehension  Normal repetition  No aphasia  No neglect  Memory recall okay at bedside improved does have developmental delay  Year 2022, president is John,    Cranials 2 through 12  No ophthalmoplegia  No nystagmus  No visual field cut  Face symmetrical  Tongue twisters okay    Upper extremities  No drift no tremor    Lower extremities  Distal proximal strength okay    Gait  Deferred      Assessment/plan    1.  Prolonged impairment of consciousness/sensorium       Uncontrolled tremors left arm for \"5 minutes\"       Question seizure    2.  History of fetal alcohol syndrome/developmental delay and some behavior difficulties    Due to prolonged spell with initial shaking and change in sensorium question postictal.  Start Keppra loaded with 1000 mg IV x1  Follow with Keppra 500 mg twice daily  Check EEG    Discussed with ER physicians during presentation  Discussed with nursing staff face-to-face  EMR notes reviewed  Tests reviewed    Total care time today 110 minutes greater than 50% face-to-face with patient and care team.  "

## 2022-08-12 NOTE — ED NOTES
Pt progressively seems to be more responsive and interactive. Was able to verbalize sore throat number as 1/10.

## 2022-08-13 ENCOUNTER — APPOINTMENT (OUTPATIENT)
Dept: NEUROLOGY | Facility: HOSPITAL | Age: 32
End: 2022-08-13
Attending: PSYCHIATRY & NEUROLOGY
Payer: MEDICAID

## 2022-08-13 ENCOUNTER — APPOINTMENT (OUTPATIENT)
Dept: PHYSICAL THERAPY | Facility: HOSPITAL | Age: 32
End: 2022-08-13
Attending: INTERNAL MEDICINE
Payer: MEDICAID

## 2022-08-13 ENCOUNTER — APPOINTMENT (OUTPATIENT)
Dept: OCCUPATIONAL THERAPY | Facility: HOSPITAL | Age: 32
End: 2022-08-13
Attending: INTERNAL MEDICINE
Payer: MEDICAID

## 2022-08-13 VITALS
OXYGEN SATURATION: 96 % | DIASTOLIC BLOOD PRESSURE: 72 MMHG | RESPIRATION RATE: 18 BRPM | HEART RATE: 100 BPM | TEMPERATURE: 99.2 F | SYSTOLIC BLOOD PRESSURE: 121 MMHG | WEIGHT: 145 LBS

## 2022-08-13 LAB
ALBUMIN SERPL-MCNC: 3.9 G/DL (ref 3.5–5)
ALP SERPL-CCNC: 90 U/L (ref 45–120)
ALT SERPL W P-5'-P-CCNC: 25 U/L (ref 0–45)
ANION GAP SERPL CALCULATED.3IONS-SCNC: 8 MMOL/L (ref 5–18)
AST SERPL W P-5'-P-CCNC: 21 U/L (ref 0–40)
BASOPHILS # BLD AUTO: 0 10E3/UL (ref 0–0.2)
BASOPHILS NFR BLD AUTO: 0 %
BILIRUB SERPL-MCNC: 0.7 MG/DL (ref 0–1)
BUN SERPL-MCNC: 11 MG/DL (ref 8–22)
CALCIUM SERPL-MCNC: 8.9 MG/DL (ref 8.5–10.5)
CHLORIDE BLD-SCNC: 108 MMOL/L (ref 98–107)
CO2 SERPL-SCNC: 23 MMOL/L (ref 22–31)
CREAT SERPL-MCNC: 0.79 MG/DL (ref 0.6–1.1)
EOSINOPHIL # BLD AUTO: 0 10E3/UL (ref 0–0.7)
EOSINOPHIL NFR BLD AUTO: 0 %
ERYTHROCYTE [DISTWIDTH] IN BLOOD BY AUTOMATED COUNT: 11.7 % (ref 10–15)
GFR SERPL CREATININE-BSD FRML MDRD: >90 ML/MIN/1.73M2
GLUCOSE BLD-MCNC: 76 MG/DL (ref 70–125)
HCT VFR BLD AUTO: 42.3 % (ref 35–47)
HGB BLD-MCNC: 14.4 G/DL (ref 11.7–15.7)
IMM GRANULOCYTES # BLD: 0 10E3/UL
IMM GRANULOCYTES NFR BLD: 0 %
LYMPHOCYTES # BLD AUTO: 0.9 10E3/UL (ref 0.8–5.3)
LYMPHOCYTES NFR BLD AUTO: 17 %
MCH RBC QN AUTO: 30.2 PG (ref 26.5–33)
MCHC RBC AUTO-ENTMCNC: 34 G/DL (ref 31.5–36.5)
MCV RBC AUTO: 89 FL (ref 78–100)
MONOCYTES # BLD AUTO: 0.7 10E3/UL (ref 0–1.3)
MONOCYTES NFR BLD AUTO: 14 %
NEUTROPHILS # BLD AUTO: 3.6 10E3/UL (ref 1.6–8.3)
NEUTROPHILS NFR BLD AUTO: 69 %
NRBC # BLD AUTO: 0 10E3/UL
NRBC BLD AUTO-RTO: 0 /100
PLATELET # BLD AUTO: 188 10E3/UL (ref 150–450)
POTASSIUM BLD-SCNC: 4 MMOL/L (ref 3.5–5)
PROT SERPL-MCNC: 7.4 G/DL (ref 6–8)
RBC # BLD AUTO: 4.77 10E6/UL (ref 3.8–5.2)
SODIUM SERPL-SCNC: 139 MMOL/L (ref 136–145)
WBC # BLD AUTO: 5.3 10E3/UL (ref 4–11)

## 2022-08-13 PROCEDURE — G0378 HOSPITAL OBSERVATION PER HR: HCPCS

## 2022-08-13 PROCEDURE — 99214 OFFICE O/P EST MOD 30 MIN: CPT | Performed by: PSYCHIATRY & NEUROLOGY

## 2022-08-13 PROCEDURE — 999N000282 HC RECOVERY ADVANTAGE BILLING CODE

## 2022-08-13 PROCEDURE — 36415 COLL VENOUS BLD VENIPUNCTURE: CPT | Performed by: INTERNAL MEDICINE

## 2022-08-13 PROCEDURE — 99217 PR OBSERVATION CARE DISCHARGE: CPT | Performed by: FAMILY MEDICINE

## 2022-08-13 PROCEDURE — 80053 COMPREHEN METABOLIC PANEL: CPT | Performed by: INTERNAL MEDICINE

## 2022-08-13 PROCEDURE — 95816 EEG AWAKE AND DROWSY: CPT

## 2022-08-13 PROCEDURE — 250N000013 HC RX MED GY IP 250 OP 250 PS 637: Performed by: PSYCHIATRY & NEUROLOGY

## 2022-08-13 PROCEDURE — 250N000013 HC RX MED GY IP 250 OP 250 PS 637: Performed by: INTERNAL MEDICINE

## 2022-08-13 PROCEDURE — 85004 AUTOMATED DIFF WBC COUNT: CPT | Performed by: INTERNAL MEDICINE

## 2022-08-13 PROCEDURE — 250N000013 HC RX MED GY IP 250 OP 250 PS 637: Performed by: FAMILY MEDICINE

## 2022-08-13 PROCEDURE — 97165 OT EVAL LOW COMPLEX 30 MIN: CPT | Mod: GO

## 2022-08-13 RX ORDER — LEVETIRACETAM 500 MG/1
500 TABLET ORAL 2 TIMES DAILY
Qty: 10 TABLET | Refills: 3 | Status: SHIPPED | OUTPATIENT
Start: 2022-08-13 | End: 2022-08-14

## 2022-08-13 RX ORDER — GUAIFENESIN/DEXTROMETHORPHAN 100-10MG/5
10 SYRUP ORAL EVERY 4 HOURS PRN
Status: DISCONTINUED | OUTPATIENT
Start: 2022-08-13 | End: 2022-08-13 | Stop reason: HOSPADM

## 2022-08-13 RX ADMIN — GUAIFENESIN AND DEXTROMETHORPHAN 10 ML: 100; 10 SYRUP ORAL at 14:18

## 2022-08-13 RX ADMIN — RISPERIDONE 2 MG: 2 TABLET ORAL at 14:18

## 2022-08-13 RX ADMIN — GUAIFENESIN AND DEXTROMETHORPHAN 10 ML: 100; 10 SYRUP ORAL at 10:05

## 2022-08-13 RX ADMIN — LEVETIRACETAM 500 MG: 500 TABLET, FILM COATED ORAL at 08:50

## 2022-08-13 RX ADMIN — METHYLPHENIDATE HYDROCHLORIDE 72 MG: 18 TABLET, EXTENDED RELEASE ORAL at 08:50

## 2022-08-13 RX ADMIN — RISPERIDONE 2 MG: 2 TABLET ORAL at 08:51

## 2022-08-13 RX ADMIN — ACETAMINOPHEN 650 MG: 325 TABLET ORAL at 10:05

## 2022-08-13 ASSESSMENT — ACTIVITIES OF DAILY LIVING (ADL)
ADLS_ACUITY_SCORE: 36
ADLS_ACUITY_SCORE: 36
ADLS_ACUITY_SCORE: 39
ADLS_ACUITY_SCORE: 36
ADLS_ACUITY_SCORE: 39

## 2022-08-13 NOTE — PROGRESS NOTES
PRIMARY DIAGNOSIS: Seizures   OUTPATIENT/OBSERVATION GOALS TO BE MET BEFORE DISCHARGE:  1. ADLs back to baseline: Yes    2. Activity and level of assistance: Up with standby assistance.    3. Pain status: Pain free.    4. Return to near baseline physical activity: Yes   Patient appears alert and oriented. Able to interact with staff with clear and organized speech. No seizures activities noted in this shift. Patient denies pain or discomfort.      Discharge Planner Nurse   Safe discharge environment identified: Yes  Barriers to discharge: No       Entered by: Carmina Garza RN 08/13/2022 3:06 AM     Please review provider order for any additional goals.   Nurse to notify provider when observation goals have been met and patient is ready for discharge.

## 2022-08-13 NOTE — PROGRESS NOTES
Physical Therapy    Pt is not appropriate for skilled PT services at this time, as they have returned to baseline per pt's mother. RN and OT have witnessed that pt is ambulatory as well, and plan is discharge home later today. Plan was discussed with RN.  Will D/C current PT orders.  Please reorder if status changes. Thank you.    8/13/2022 by CELESTINE LloydT

## 2022-08-13 NOTE — PROGRESS NOTES
NEUROLOGY INPATIENT PROGRESS NOTE       [facility] NEUROLOGY [place]  [address] [place] [address]  Tel: [phone]  Fax: [phone]  [email or web address]     ASSESSMENT & PLAN   Date: 08/13/2022  Hospital Day#: 0  Visit diagnosis: Seizure-like activity    Seizure-like activity  32-year-old female with fetal alcohol syndrome, developmental delay admitted after seizure-like activity.  CT of the head and CTA unremarkable  MRI brain does not show any mesial temporal sclerosis or other epileptiform focus EEG pending  Patient loaded with Keppra and maintenance dose of 500 mg twice a day  Patient COVID-positive currently not on any treatment    Neurology Discharge Planning :   Hopefully later today    Carson Strickland MD  [facility]  (Formerly, [facility]     PROBLEM LIST      Patient Active Problem List   Diagnosis Code     Seizures (H) R56.9     Weakness R53.1     Unresponsive episode R41.89       Past Medical History:   Patient  has a past medical history of Fetal alcohol syndrome.     SUBJECTIVE     No further episodes of loss of consciousness.  Patient appears to be at baseline     OBJECTIVE     Vital signs in last 24 hours  Temp:  [97.9  F (36.6  C)-99.2  F (37.3  C)] 98.9  F (37.2  C)  Pulse:  [63-92] 80  Resp:  [16-28] 16  BP: (105-152)/(59-97) 109/63  SpO2:  [96 %-99 %] 96 %    Weight:   Wt Readings from Last 1 Encounters:   08/12/22 65.8 kg (145 lb)         I/O last 24 hours    Intake/Output Summary (Last 24 hours) at 8/13/2022 0614  Last data filed at 8/12/2022 1829  Gross per 24 hour   Intake 1050 ml   Output 1000 ml   Net 50 ml       Review of Systems   Pertinent items are noted in HPI.    General Physical Exam: Patient is alert and oriented x 3. Vital signs were reviewed and are documented in EMR. Neck was supple, no Carotid bruit, thyromegaly, JVD or lymphadenopathy noted.  Neurological Exam:  Mental status: Alert on  oriented can tell me her name date knows the name of the president  Speech: Normal with no dysarthria or aphasia.  Cranial nerves: 2 through 12 intact  Motor: Normal mass and tone with 5/5 strength  Reflexes: 2+ with downgoing toes  Sensory: Intact light touch pinprick no double simultaneous extinction  Coordination: Intact finger-nose testing  Gait: Deferred     DIAGNOSTIC STUDIES     Pertinent Radiology   Following imaging studies were reviewed:    CT  HEAD CT: Normal head CT.     HEAD CTA: Normal CTA [place].     NECK CTA: Normal neck CTA.    MRI  1.  No epileptogenic focus identified.  2.  No acute intracranial process.    EEG  pending     Pertinent Labs   Lab Results: Personally Reviewed  Recent Results (from the past 24 hour(s))   Comprehensive metabolic panel    Collection Time: 08/12/22 12:22 PM   Result Value Ref Range    Sodium 138 136 - 145 mmol/L    Potassium 3.8 3.5 - 5.0 mmol/L    Chloride 106 98 - 107 mmol/L    Carbon Dioxide (CO2) 26 22 - 31 mmol/L    Anion Gap 6 5 - 18 mmol/L    Urea Nitrogen 15 8 - 22 mg/dL    Creatinine 0.77 0.60 - 1.10 mg/dL    Calcium 9.0 8.5 - 10.5 mg/dL    Glucose 93 70 - 125 mg/dL    Alkaline Phosphatase 86 45 - 120 U/L    AST 23 0 - 40 U/L    ALT 30 0 - 45 U/L    Protein Total 7.0 6.0 - 8.0 g/dL    Albumin 4.0 3.5 - 5.0 g/dL    Bilirubin Total 1.0 0.0 - 1.0 mg/dL    GFR Estimate >90 >60 mL/min/1.73m2   Magnesium    Collection Time: 08/12/22 12:22 PM   Result Value Ref Range    Magnesium 2.0 1.8 - 2.6 mg/dL   TSH with free T4 reflex    Collection Time: 08/12/22 12:22 PM   Result Value Ref Range    TSH 2.60 0.30 - 5.00 uIU/mL   HCG qualitative Blood    Collection Time: 08/12/22 12:22 PM   Result Value Ref Range    hCG Serum Qualitative Negative Negative   Partial thromboplastin time    Collection Time: 08/12/22 12:22 PM   Result Value Ref Range    aPTT 27 22 - 38 Seconds   INR    Collection Time: 08/12/22 12:22 PM   Result Value Ref Range    INR 1.09 0.85 - 1.15    Troponin I    Collection Time: 08/12/22 12:22 PM   Result Value Ref Range    Troponin I <0.01 0.00 - 0.29 ng/mL   CBC with platelets and differential    Collection Time: 08/12/22 12:22 PM   Result Value Ref Range    WBC Count 4.2 4.0 - 11.0 10e3/uL    RBC Count 4.44 3.80 - 5.20 10e6/uL    Hemoglobin 13.3 11.7 - 15.7 g/dL    Hematocrit 38.8 35.0 - 47.0 %    MCV 87 78 - 100 fL    MCH 30.0 26.5 - 33.0 pg    MCHC 34.3 31.5 - 36.5 g/dL    RDW 11.4 10.0 - 15.0 %    Platelet Count 191 150 - 450 10e3/uL    % Neutrophils 79 %    % Lymphocytes 10 %    % Monocytes 9 %    % Eosinophils 1 %    % Basophils 1 %    % Immature Granulocytes 0 %    NRBCs per 100 WBC 0 <1 /100    Absolute Neutrophils 3.4 1.6 - 8.3 10e3/uL    Absolute Lymphocytes 0.4 (L) 0.8 - 5.3 10e3/uL    Absolute Monocytes 0.4 0.0 - 1.3 10e3/uL    Absolute Eosinophils 0.0 0.0 - 0.7 10e3/uL    Absolute Basophils 0.0 0.0 - 0.2 10e3/uL    Absolute Immature Granulocytes 0.0 <=0.4 10e3/uL    Absolute NRBCs 0.0 10e3/uL   Symptomatic; Yes; 8/11/2022 Influenza A/B & SARS-CoV2 (COVID-19) Virus PCR Multiplex Nasopharyngeal    Collection Time: 08/12/22  3:52 PM    Specimen: Nasopharyngeal; Swab   Result Value Ref Range    Influenza A PCR Negative Negative    Influenza B PCR Negative Negative    RSV PCR Negative Negative    SARS CoV2 PCR Positive (A) Negative         HOSPITAL MEDICATIONS       guanFACINE  1 mg Oral At Bedtime     levETIRAcetam  500 mg Oral BID     methylphenidate  10 mg Oral Daily with lunch     methylphenidate  72 mg Oral QAM     mirtazapine  15 mg Oral At Bedtime     risperiDONE  2 mg Oral TID     sodium chloride (PF)  3 mL Intracatheter Q8H        Total time spent for face to face visit, reviewing labs/imaging studies, counseling and coordination of care was: 30 Minutes More than 50% of this time was spent on counseling and coordination of care.      This note was dictated using voice recognition software.  Any grammatical or context distortions are  unintentional and inherent to the software.

## 2022-08-13 NOTE — PLAN OF CARE
Goal Outcome Evaluation:    Plan of Care Reviewed With: patient, caregiver     Overall Patient Progress: improving    Outcome Evaluation: Discharge education given to patient and mother.  Escorted to vehicle via wheelchair.      
Occupational Therapy Discharge Summary    Reason for therapy discharge:    Discharged to home.    Progress towards therapy goal(s). See goals on Care Plan in [facility] electronic health record for goal details.  OT eval only    Therapy recommendation(s):    No further therapy is recommended.        
PRIMARY DIAGNOSIS: ACUTE PAIN  OUTPATIENT/OBSERVATION GOALS TO BE MET BEFORE DISCHARGE:  1. Pain Status: Improved-controlled with oral pain medications.    2. Return to near baseline physical activity: Yes    3. Cleared for discharge by consultants (if involved): Yes    Discharge Planner Nurse   Safe discharge environment identified: Yes  Barriers to discharge: Yes       Entered by: Kalyn Carmichael RN 08/13/2022 1:31 PM     Please review provider order for any additional goals.   Nurse to notify provider when observation goals have been met and patient is ready for discharge.Goal Outcome Evaluation:      Alert cooperative with cares co sore throat medicated with Tylenol po  Co feeling warm temperature 99.6 no seizures no nausea. Mother Kirti  Is guardian feels patient is back to base line hoping she can go home today  EEG coming.                
PRIMARY DIAGNOSIS: Seizures, Covid  OUTPATIENT/OBSERVATION GOALS TO BE MET BEFORE DISCHARGE:  ADLs back to baseline: Yes    Activity and level of assistance: Ambulating independently.    Pain status: Pain free.    Return to near baseline physical activity: Yes     Discharge Planner Nurse   Safe discharge environment identified: Yes  Barriers to discharge: No       Entered by: Yancy Riley RN 08/13/2022 4:31 PM     Please review provider order for any additional goals.   Nurse to notify provider when observation goals have been met and patient is ready for discharge.Goal Outcome Evaluation:  Pt is wanting to go home.  Waiting on Dr. Merida.  Pt feels good.  She is calm and cooperative.                       
PRIMARY DIAGNOSIS: Seizures, Covid  OUTPATIENT/OBSERVATION GOALS TO BE MET BEFORE DISCHARGE:  ADLs back to baseline: Yes    Activity and level of assistance: Ambulating independently.    Pain status: Pain free.    Return to near baseline physical activity: Yes     Discharge Planner Nurse   Safe discharge environment identified: Yes  Barriers to discharge: No       Entered by: Yancy Riley RN 08/13/2022 4:35 PM     Please review provider order for any additional goals.   Nurse to notify provider when observation goals have been met and patient is ready for discharge.Goal Outcome Evaluation:  Pt had her EEG and has been cleared to go home.  We are unable to get her Ritalin so we will let her mom know that she did not get it today yet.                       
PFO closure planned

## 2022-08-13 NOTE — DISCHARGE SUMMARY
[facility]  Hospitalist Discharge Summary      Date of Admission:  8/12/2022  Date of Discharge:  8/13/2022  Discharging Provider: Ani Merida MD  Discharge Service: Hospitalist Service    Discharge Diagnoses   Unresponsive episode  Covid Infection      Follow-ups Needed After Discharge   Follow-up Appointments     Follow-up and recommended labs and tests       Follow up with primary care provider, Physician No Ref-Primary, within 7   days for hospital follow- up.  The following labs/tests are recommended:   CBC, BMP. Follow up with Neurology as needed.                 Discharge Disposition   Discharged to home  Condition at discharge: Stable      Hospital Course   32 year old female with a history of fetal alcohol syndrome maker encephalopathy, ADD who presented for an episode of unresponsiveness.      Acute unresponsive episode  -Syncope related to Covid vs seizure  Code stroke called in ED-complete work-up without evidence of stroke.  CTA head and neck, MRI brain negative  Neurology team evaluated and recommended Keppra dose loading as well as EEG-- EEG read pending at discharge and will be followed up by neurology. Keppra will be discontinued if not needed     Covid infection  -- Mild symptoms of sore throat and cough, no hypoxia -- supportive OP cares     Fetal alcohol syndrome and developmental delay        Consultations This Hospital Stay   NEUROSURGERY IP CONSULT  NEUROLOGY IP CONSULT  PHYSICAL THERAPY ADULT IP CONSULT  OCCUPATIONAL THERAPY ADULT IP CONSULT    Code Status   Full Code    Time Spent on this Encounter   I, Ani Merida MD, personally saw the patient today and spent less than or equal to 30 minutes discharging this patient.       Ani Merida MD  [address] [address]  Phone: [phone]  Fax: [phone]  ______________________________________________________________________    Physical  Exam   Vital Signs: Temp: 99.6  F (37.6  C) Temp src: Oral BP: 121/72 Pulse: 88   Resp: 18 SpO2: 96 % O2 Device: None (Room air)    Weight: 145 lbs 0 oz  General: Well developed thin adult female, No apparent distress  Cardiovascular: Regular rate and rhythm, Normal S1, S2  Lungs: Clear to auscultation bilaterally  Abdomen: Soft, Non-tender, not distended, Bowel sounds present  Extremities: No edema, no clubbing  Skin: Warm and well-perfused without lesions.  Neurologic: Alert. Face is symmetric, Moves all extremities equally         Primary Care Physician   Physician No Ref-Primary    Discharge Orders      Reason for your hospital stay    Unresponsive episode     Follow-up and recommended labs and tests     Follow up with primary care provider, Physician No Ref-Primary, within 7 days for hospital follow- up.  The following labs/tests are recommended: CBC, BMP. Follow up with Neurology as needed.     Activity    Your activity upon discharge: activity as tolerated     Diet    Follow this diet upon discharge:    Regular Diet Adult       Significant Results and Procedures   Most Recent 3 CBC's:Recent Labs   Lab Test 08/13/22  0708 08/12/22  1222   WBC 5.3 4.2   HGB 14.4 13.3   MCV 89 87    191     Most Recent 3 BMP's:Recent Labs   Lab Test 08/13/22  0708 08/12/22  1222    138   POTASSIUM 4.0 3.8   CHLORIDE 108* 106   CO2 23 26   BUN 11 15   CR 0.79 0.77   ANIONGAP 8 6   TRISTAN 8.9 9.0   GLC 76 93       Discharge Medications   Current Discharge Medication List      START taking these medications    Details   levETIRAcetam (KEPPRA) 500 MG tablet Take 1 tablet (500 mg) by mouth 2 times daily  Qty: 10 tablet, Refills: 3    Associated Diagnoses: Seizures (H)         CONTINUE these medications which have NOT CHANGED    Details   guanFACINE HCl (INTUNIV) 4 MG TB24 Take 1 mg by mouth At Bedtime      methylphenidate (CONCERTA) 36 MG CR tablet Take 72 mg by mouth every morning      methylphenidate (RITALIN LA) 10 MG 24  hr capsule Take 10 mg by mouth daily (with lunch) At noon      mirtazapine (REMERON) 15 MG tablet Take 15 mg by mouth At Bedtime      risperiDONE (RISPERDAL M-TABS) 1 MG ODT Place 1 mg under the tongue daily as needed (agitation)      risperiDONE (RISPERDAL) 2 MG tablet Take 2 mg by mouth 3 times daily           Allergies   Allergies   Allergen Reactions     Sulfa Drugs Rash

## 2022-08-13 NOTE — PROGRESS NOTES
"PRIMARY DIAGNOSIS: \"Seizures  OUTPATIENT/OBSERVATION GOALS TO BE MET BEFORE DISCHARGE:  1. ADLs back to baseline: Yes    2. Activity and level of assistance: Up with standby assistance.    3. Pain status: Pain free.    4. Return to near baseline physical activity: Yes   5. Patient slept through entire shift. No seizures activities exhibited in this shift. No s/sx of COVID-19 infection.       Discharge Planner Nurse   Safe discharge environment identified: Yes  Barriers to discharge: No       Entered by: Carmina Garza RN 08/13/2022 4:06 AM     Please review provider order for any additional goals.   Nurse to notify provider when observation goals have been met and patient is ready for discharge.  "

## 2022-08-13 NOTE — ED NOTES
EMERGENCY DEPARTMENT SIGN OUT NOTE        ED COURSE AND MEDICAL DECISION MAKING  Patient was signed out to me by Dr Alex Urrutia    In brief, Toshia Young is a 32 year old female who initially presented after LOC that persisted for ~10 minutes with altered mental status after she regained consciousness. Patient was a Tier 1 Stroke Code, which was deescalated after CT / CTA imaging returned unremarkable.     At time of sign out, MRI pending.     [place] [facility] [place] returned unremarkable. Patient has had slow, but progressive improvement in her mental status.    I did consult with Dr. Castro, Neurology, who recommended administering Keppra (1000mg IV) and he will see her while she is in the hospital.    Her COVID test incidentally returned positive.     Patient stable throughout ED course.      FINAL IMPRESSION    1. Seizures (H)    2. Unresponsive episode    3. Weakness    4. Infection due to 2019 novel coronavirus        ED MEDS  Medications   levETIRAcetam (KEPPRA) tablet 500 mg (500 mg Oral Given 8/13/22 0850)   lidocaine 1 % 0.1-1 mL (has no administration in time range)   lidocaine (LMX4) cream (has no administration in time range)   sodium chloride (PF) 0.9% PF flush 3 mL (3 mLs Intracatheter Given 8/13/22 0851)   sodium chloride (PF) 0.9% PF flush 3 mL (has no administration in time range)   acetaminophen (TYLENOL) tablet 650 mg (has no administration in time range)     Or   acetaminophen (TYLENOL) Suppository 650 mg (has no administration in time range)   melatonin tablet 1 mg (has no administration in time range)   ondansetron (ZOFRAN ODT) ODT tab 4 mg (has no administration in time range)     Or   ondansetron (ZOFRAN) injection 4 mg (has no administration in time range)   senna-docusate (SENOKOT-S/PERICOLACE) 8.6-50 MG per tablet 1 tablet (has no administration in time range)     Or   senna-docusate (SENOKOT-S/PERICOLACE) 8.6-50 MG per tablet 2 tablet (has no administration in time range)    mirtazapine (REMERON) tablet 15 mg (15 mg Oral Given 8/12/22 2213)   risperiDONE (risperDAL M-TABS) ODT tab 1 mg (has no administration in time range)   risperiDONE (risperDAL) tablet 2 mg (2 mg Oral Given 8/13/22 0851)   methylphenidate HCl ER (CONCERTA) CR tablet 72 mg (72 mg Oral Given 8/13/22 0850)   methylphenidate (RITALIN LA) CP24 10 mg (has no administration in time range)   guanFACINE (INTUNIV) 24 hr tablet 1 mg (has no administration in time range)   0.9% sodium chloride BOLUS (0 mLs Intravenous Stopped 8/12/22 1343)   iopamidol (ISOVUE-370) solution 75 mL (75 mLs Intravenous Given 8/12/22 1209)   acetaminophen (TYLENOL) tablet 975 mg (975 mg Oral Given 8/12/22 1345)   gadobutrol (GADAVIST) injection 6 mL (6 mLs Intravenous Given 8/12/22 1510)   ibuprofen (ADVIL/MOTRIN) tablet 600 mg (600 mg Oral Given 8/12/22 1552)   levETIRAcetam (KEPPRA) 1,000 mg in sodium chloride 0.9 % 100 mL intermittent infusion (0 mg Intravenous Stopped 8/12/22 1829)       LAB  Labs Ordered and Resulted from Time of ED Arrival to Time of ED Departure   CBC WITH PLATELETS AND DIFFERENTIAL - Abnormal       Result Value    WBC Count 4.2      RBC Count 4.44      Hemoglobin 13.3      Hematocrit 38.8      MCV 87      MCH 30.0      MCHC 34.3      RDW 11.4      Platelet Count 191      % Neutrophils 79      % Lymphocytes 10      % Monocytes 9      % Eosinophils 1      % Basophils 1      % Immature Granulocytes 0      NRBCs per 100 WBC 0      Absolute Neutrophils 3.4      Absolute Lymphocytes 0.4 (*)     Absolute Monocytes 0.4      Absolute Eosinophils 0.0      Absolute Basophils 0.0      Absolute Immature Granulocytes 0.0      Absolute NRBCs 0.0     INFLUENZA A/B & SARS-COV2 PCR MULTIPLEX - Abnormal    Influenza A PCR Negative      Influenza B PCR Negative      RSV PCR Negative      SARS CoV2 PCR Positive (*)    COMPREHENSIVE METABOLIC PANEL - Normal    Sodium 138      Potassium 3.8      Chloride 106      Carbon Dioxide (CO2) 26       Anion Gap 6      Urea Nitrogen 15      Creatinine 0.77      Calcium 9.0      Glucose 93      Alkaline Phosphatase 86      AST 23      ALT 30      Protein Total 7.0      Albumin 4.0      Bilirubin Total 1.0      GFR Estimate >90     MAGNESIUM - Normal    Magnesium 2.0     TSH WITH FREE T4 REFLEX - Normal    TSH 2.60     HCG QUALITATIVE PREGNANCY - Normal    hCG Serum Qualitative Negative     PARTIAL THROMBOPLASTIN TIME - Normal    aPTT 27     INR - Normal    INR 1.09     TROPONIN I - Normal    Troponin I <0.01     GLUCOSE MONITOR NURSING POCT       RADIOLOGY    MR Brain w/o & w Contrast   Final Result   IMPRESSION:   1.  No epileptogenic focus identified.   2.  No acute intracranial process.      CTA Head Neck with Contrast   Final Result   IMPRESSION:    HEAD CT: Normal head CT.      HEAD CTA: Normal CTA [place] [facility].      NECK CTA: Normal neck CTA.        Darline Gtz MD  [address] [address] [phone]     Darline Gtz MD  08/13/22 0914

## 2022-08-14 ENCOUNTER — TELEPHONE (OUTPATIENT)
Dept: NEUROLOGY | Facility: CLINIC | Age: 32
End: 2022-08-14

## 2022-08-14 DIAGNOSIS — G40.209 PARTIAL SYMPTOMATIC EPILEPSY WITH COMPLEX PARTIAL SEIZURES, NOT INTRACTABLE, WITHOUT STATUS EPILEPTICUS (H): ICD-10-CM

## 2022-08-14 DIAGNOSIS — R56.9 SEIZURES (H): ICD-10-CM

## 2022-08-14 PROCEDURE — 95816 EEG AWAKE AND DROWSY: CPT | Mod: 26 | Performed by: PSYCHIATRY & NEUROLOGY

## 2022-08-14 RX ORDER — LEVETIRACETAM 500 MG/1
500 TABLET ORAL 2 TIMES DAILY
Qty: 60 TABLET | Refills: 11 | Status: SHIPPED | OUTPATIENT
Start: 2022-08-14 | End: 2022-08-23 | Stop reason: ALTCHOICE

## 2022-08-14 NOTE — TELEPHONE ENCOUNTER
Patient is a 32-year-old female with fetal alcohol syndrome, developmental delay who was admitted to the hospital on 8/12/2022 after she had a seizure-like activity.  According to family member she was reading on the couch and suddenly became limp she fell to the floor, and family members noticed some movement in the lower extremity.  She had CT of the head and CTA that was normal.  Also MRI brain did not show any abnormality.  She was discharged before EEG was read that showed right temporal sharp activity with phase reversal at T6.  I have called patient's legal guardian and discussed EEG findings.  I recommended:    1.  Continue Keppra 500 mg twice daily I have refilled the prescription.  I have informed the guardian that she will need this medicine indefinitely  2.  Follow-up with me in 6 to 8 weeks.  Okay to add on.

## 2022-08-15 NOTE — TELEPHONE ENCOUNTER
Spoke to Kirti, pt's mother to assist with scheduling a follow up appt.  Mother states pt will no longer be coming to [facility]. She will be seeing a neurologist through [facility] where her PCP is at.  No appt needed.    Yazmin Wetzel LPN on 8/15/2022 at 9:45 AM

## 2022-08-22 ENCOUNTER — TELEPHONE (OUTPATIENT)
Dept: NEUROLOGY | Facility: CLINIC | Age: 32
End: 2022-08-22

## 2022-08-22 DIAGNOSIS — R53.1 WEAKNESS: ICD-10-CM

## 2022-08-22 DIAGNOSIS — G40.209 PARTIAL SYMPTOMATIC EPILEPSY WITH COMPLEX PARTIAL SEIZURES, NOT INTRACTABLE, WITHOUT STATUS EPILEPTICUS (H): Primary | ICD-10-CM

## 2022-08-22 NOTE — TELEPHONE ENCOUNTER
I called and spoke with Kirti and she will have Toshia do the MRI but she insists the sx are worse since starting the Keppra and would like to try a different medication. She reports this is not fair to the patient as she has a developmental disability and she states she did not have difficulty with speech or walking prior to going to the hospital  Sandee Tariq CMA on 8/22/2022 at 4:10 PM

## 2022-08-22 NOTE — TELEPHONE ENCOUNTER
[facility] Call Center    Phone Message    May a detailed message be left on voicemail: Yes    Reason for Call: Symptoms or Concerns     If patient has red-flag symptoms, warm transfer to triage line    Current symptom or concern: Patient was discharged from [facility] nine days.  Dr. Strickland saw patient in hospital and prescribed Keppra.  Mother of patient said since she has been on Keppra patient has had the following symptoms:    Walking/leaning to left side  Dragging left leg  Speech is slurred     Patient's mother, Kirti, requests a call back    Patient has had symptoms for 9 days and not getting any better    Symptoms have been present for:  9 day(s)    Has patient previously been seen for this? Yes    By Em:     Date: ASAP        Action Taken: Message routed to:  [facility]    Travel Screening: Not Applicable

## 2022-08-22 NOTE — TELEPHONE ENCOUNTER
Patient has complex partial seizures.  Although her CT of the head, CTA and MRI were normal, EEG showed a left temporal sharp discharge suggesting low threshold for seizure.  Recommendation:  1.  Repeat MRI brain without contrast  2.  Continue Keppra 500 mg twice daily  3.  Follow-up after above tests

## 2022-08-23 RX ORDER — DIVALPROEX SODIUM 500 MG/1
500 TABLET, EXTENDED RELEASE ORAL DAILY
Qty: 30 TABLET | Refills: 11 | Status: SHIPPED | OUTPATIENT
Start: 2022-08-23 | End: 2022-09-07

## 2022-08-23 NOTE — TELEPHONE ENCOUNTER
I called Kirti and left detailed message. Per previous encounter, she will be seeing neuro through [facility] and I asked she follow up with them  Sandee Tariq CMA on 8/23/2022 at 12:39 PM

## 2022-08-23 NOTE — TELEPHONE ENCOUNTER
[facility] Call Center    Phone Message    May a detailed message be left on voicemail: yes     Reason for Call: Other: Kirti returning call to [facility]. please call back when available.     Action Taken: Other: neurology    Travel Screening: Not Applicable

## 2022-08-23 NOTE — TELEPHONE ENCOUNTER
Okay to discontinue Keppra and start Depakote  mg at bedtime.  I have sent a prescription to patient's pharmacy.

## 2022-08-24 ENCOUNTER — DOCUMENTATION ONLY (OUTPATIENT)
Dept: OTHER | Facility: CLINIC | Age: 32
End: 2022-08-24

## 2022-08-31 ENCOUNTER — HOSPITAL ENCOUNTER (OUTPATIENT)
Dept: MRI IMAGING | Facility: HOSPITAL | Age: 32
Discharge: HOME | End: 2022-08-31
Attending: PSYCHIATRY & NEUROLOGY | Admitting: PSYCHIATRY & NEUROLOGY
Payer: MEDICAID

## 2022-08-31 DIAGNOSIS — G40.209 PARTIAL SYMPTOMATIC EPILEPSY WITH COMPLEX PARTIAL SEIZURES, NOT INTRACTABLE, WITHOUT STATUS EPILEPTICUS (H): ICD-10-CM

## 2022-08-31 DIAGNOSIS — R53.1 WEAKNESS: ICD-10-CM

## 2022-08-31 PROCEDURE — 70551 MRI BRAIN STEM W/O DYE: CPT

## 2022-09-06 ENCOUNTER — TELEPHONE (OUTPATIENT)
Dept: NEUROLOGY | Facility: CLINIC | Age: 32
End: 2022-09-06

## 2022-09-06 NOTE — TELEPHONE ENCOUNTER
[facility] Call Center    Phone Message    May a detailed message be left on voicemail: yes     Reason for Call: Requesting Results   Name/type of test: MRI  Date of test: 8/12/22    Patient's guardian, Kirti, requests a call back to discuss MRI results.    Dr. Strickland saw patient in hospital and order the MRI>      Action Taken: Message routed to:  [facility]    Travel Screening: Not Applicable

## 2022-09-07 ENCOUNTER — NURSE TRIAGE (OUTPATIENT)
Dept: NURSING | Facility: CLINIC | Age: 32
End: 2022-09-07

## 2022-09-07 ENCOUNTER — APPOINTMENT (OUTPATIENT)
Dept: CT IMAGING | Facility: HOSPITAL | Age: 32
End: 2022-09-07
Attending: EMERGENCY MEDICINE
Payer: MEDICAID

## 2022-09-07 ENCOUNTER — HOSPITAL ENCOUNTER (OUTPATIENT)
Facility: HOSPITAL | Age: 32
Setting detail: OBSERVATION
Discharge: HOME | End: 2022-09-09
Attending: EMERGENCY MEDICINE | Admitting: FAMILY MEDICINE
Payer: MEDICAID

## 2022-09-07 DIAGNOSIS — G40.209 PARTIAL SYMPTOMATIC EPILEPSY WITH COMPLEX PARTIAL SEIZURES, NOT INTRACTABLE, WITHOUT STATUS EPILEPTICUS (H): ICD-10-CM

## 2022-09-07 DIAGNOSIS — G40.909 SEIZURE DISORDER (H): ICD-10-CM

## 2022-09-07 DIAGNOSIS — R59.0 ENLARGED LYMPH NODE IN NECK: ICD-10-CM

## 2022-09-07 DIAGNOSIS — R49.9 CHANGE IN VOICE: Primary | ICD-10-CM

## 2022-09-07 PROBLEM — F91.9 BEHAVIOR DISTURBANCE: Status: ACTIVE | Noted: 2022-04-22

## 2022-09-07 PROBLEM — F51.01 INSOMNIA, IDIOPATHIC: Status: ACTIVE | Noted: 2022-03-29

## 2022-09-07 LAB
ALBUMIN SERPL-MCNC: 3.7 G/DL (ref 3.5–5)
ALP SERPL-CCNC: 77 U/L (ref 45–120)
ALT SERPL W P-5'-P-CCNC: 16 U/L (ref 0–45)
AMMONIA PLAS-SCNC: 35 UMOL/L (ref 11–35)
ANION GAP SERPL CALCULATED.3IONS-SCNC: 9 MMOL/L (ref 5–18)
AST SERPL W P-5'-P-CCNC: 18 U/L (ref 0–40)
BASOPHILS # BLD AUTO: 0 10E3/UL (ref 0–0.2)
BASOPHILS NFR BLD AUTO: 1 %
BILIRUB SERPL-MCNC: 0.7 MG/DL (ref 0–1)
BUN SERPL-MCNC: 16 MG/DL (ref 8–22)
CALCIUM SERPL-MCNC: 9.3 MG/DL (ref 8.5–10.5)
CHLORIDE BLD-SCNC: 105 MMOL/L (ref 98–107)
CO2 SERPL-SCNC: 25 MMOL/L (ref 22–31)
CREAT SERPL-MCNC: 0.66 MG/DL (ref 0.6–1.1)
EOSINOPHIL # BLD AUTO: 0.1 10E3/UL (ref 0–0.7)
EOSINOPHIL NFR BLD AUTO: 1 %
ERYTHROCYTE [DISTWIDTH] IN BLOOD BY AUTOMATED COUNT: 11.7 % (ref 10–15)
GFR SERPL CREATININE-BSD FRML MDRD: >90 ML/MIN/1.73M2
GLUCOSE BLD-MCNC: 89 MG/DL (ref 70–125)
HCT VFR BLD AUTO: 37.3 % (ref 35–47)
HGB BLD-MCNC: 13.1 G/DL (ref 11.7–15.7)
HOLD SPECIMEN: NORMAL
IMM GRANULOCYTES # BLD: 0 10E3/UL
IMM GRANULOCYTES NFR BLD: 0 %
LYMPHOCYTES # BLD AUTO: 2 10E3/UL (ref 0.8–5.3)
LYMPHOCYTES NFR BLD AUTO: 33 %
MAGNESIUM SERPL-MCNC: 2 MG/DL (ref 1.8–2.6)
MCH RBC QN AUTO: 30.3 PG (ref 26.5–33)
MCHC RBC AUTO-ENTMCNC: 35.1 G/DL (ref 31.5–36.5)
MCV RBC AUTO: 86 FL (ref 78–100)
MONOCYTES # BLD AUTO: 0.3 10E3/UL (ref 0–1.3)
MONOCYTES NFR BLD AUTO: 5 %
NEUTROPHILS # BLD AUTO: 3.7 10E3/UL (ref 1.6–8.3)
NEUTROPHILS NFR BLD AUTO: 60 %
NRBC # BLD AUTO: 0 10E3/UL
NRBC BLD AUTO-RTO: 0 /100
PLATELET # BLD AUTO: 200 10E3/UL (ref 150–450)
POTASSIUM BLD-SCNC: 3.8 MMOL/L (ref 3.5–5)
PROT SERPL-MCNC: 7.5 G/DL (ref 6–8)
RBC # BLD AUTO: 4.33 10E6/UL (ref 3.8–5.2)
SODIUM SERPL-SCNC: 139 MMOL/L (ref 136–145)
VALPROATE SERPL-MCNC: 48.2 UG/ML
WBC # BLD AUTO: 6.1 10E3/UL (ref 4–11)

## 2022-09-07 PROCEDURE — 250N000009 HC RX 250: Performed by: EMERGENCY MEDICINE

## 2022-09-07 PROCEDURE — 80164 ASSAY DIPROPYLACETIC ACD TOT: CPT | Performed by: EMERGENCY MEDICINE

## 2022-09-07 PROCEDURE — G0378 HOSPITAL OBSERVATION PER HR: HCPCS

## 2022-09-07 PROCEDURE — 99285 EMERGENCY DEPT VISIT HI MDM: CPT | Mod: 25

## 2022-09-07 PROCEDURE — 250N000011 HC RX IP 250 OP 636

## 2022-09-07 PROCEDURE — 258N000003 HC RX IP 258 OP 636: Performed by: EMERGENCY MEDICINE

## 2022-09-07 PROCEDURE — 83735 ASSAY OF MAGNESIUM: CPT | Performed by: EMERGENCY MEDICINE

## 2022-09-07 PROCEDURE — 82140 ASSAY OF AMMONIA: CPT | Performed by: EMERGENCY MEDICINE

## 2022-09-07 PROCEDURE — 70450 CT HEAD/BRAIN W/O DYE: CPT

## 2022-09-07 PROCEDURE — 96374 THER/PROPH/DIAG INJ IV PUSH: CPT

## 2022-09-07 PROCEDURE — 80053 COMPREHEN METABOLIC PANEL: CPT | Performed by: EMERGENCY MEDICINE

## 2022-09-07 PROCEDURE — 85004 AUTOMATED DIFF WBC COUNT: CPT | Performed by: EMERGENCY MEDICINE

## 2022-09-07 PROCEDURE — 36415 COLL VENOUS BLD VENIPUNCTURE: CPT | Performed by: EMERGENCY MEDICINE

## 2022-09-07 RX ORDER — PROCHLORPERAZINE 25 MG
25 SUPPOSITORY, RECTAL RECTAL EVERY 12 HOURS PRN
Status: DISCONTINUED | OUTPATIENT
Start: 2022-09-07 | End: 2022-09-09 | Stop reason: HOSPADM

## 2022-09-07 RX ORDER — ONDANSETRON 4 MG/1
4 TABLET, ORALLY DISINTEGRATING ORAL EVERY 6 HOURS PRN
Status: DISCONTINUED | OUTPATIENT
Start: 2022-09-07 | End: 2022-09-09 | Stop reason: HOSPADM

## 2022-09-07 RX ORDER — DIVALPROEX SODIUM 500 MG/1
1000 TABLET, EXTENDED RELEASE ORAL DAILY
Qty: 60 TABLET | Refills: 11 | Status: ON HOLD | OUTPATIENT
Start: 2022-09-07 | End: 2022-09-09

## 2022-09-07 RX ORDER — LORAZEPAM 2 MG/ML
INJECTION INTRAMUSCULAR
Status: DISCONTINUED
Start: 2022-09-07 | End: 2022-09-07

## 2022-09-07 RX ORDER — LORAZEPAM 2 MG/ML
INJECTION INTRAMUSCULAR
Status: COMPLETED
Start: 2022-09-07 | End: 2022-09-07

## 2022-09-07 RX ORDER — AMOXICILLIN 250 MG
2 CAPSULE ORAL 2 TIMES DAILY PRN
Status: DISCONTINUED | OUTPATIENT
Start: 2022-09-07 | End: 2022-09-09 | Stop reason: HOSPADM

## 2022-09-07 RX ORDER — AMOXICILLIN 250 MG
1 CAPSULE ORAL 2 TIMES DAILY PRN
Status: DISCONTINUED | OUTPATIENT
Start: 2022-09-07 | End: 2022-09-09 | Stop reason: HOSPADM

## 2022-09-07 RX ORDER — ONDANSETRON 2 MG/ML
4 INJECTION INTRAMUSCULAR; INTRAVENOUS EVERY 6 HOURS PRN
Status: DISCONTINUED | OUTPATIENT
Start: 2022-09-07 | End: 2022-09-09 | Stop reason: HOSPADM

## 2022-09-07 RX ORDER — POLYETHYLENE GLYCOL 3350 17 G/17G
17 POWDER, FOR SOLUTION ORAL DAILY PRN
Status: DISCONTINUED | OUTPATIENT
Start: 2022-09-07 | End: 2022-09-09 | Stop reason: HOSPADM

## 2022-09-07 RX ORDER — LORAZEPAM 2 MG/ML
2 INJECTION INTRAMUSCULAR ONCE
Status: DISCONTINUED | OUTPATIENT
Start: 2022-09-07 | End: 2022-09-07

## 2022-09-07 RX ORDER — PROCHLORPERAZINE MALEATE 10 MG
10 TABLET ORAL EVERY 6 HOURS PRN
Status: DISCONTINUED | OUTPATIENT
Start: 2022-09-07 | End: 2022-09-09 | Stop reason: HOSPADM

## 2022-09-07 RX ADMIN — SODIUM CHLORIDE 1000 MG: 9 INJECTION, SOLUTION INTRAVENOUS at 18:00

## 2022-09-07 RX ADMIN — LORAZEPAM 1 MG: 2 INJECTION, SOLUTION INTRAMUSCULAR; INTRAVENOUS at 20:37

## 2022-09-07 ASSESSMENT — ENCOUNTER SYMPTOMS
ABDOMINAL PAIN: 0
VOMITING: 0
VOICE CHANGE: 1
NAUSEA: 0
SEIZURES: 1
SHORTNESS OF BREATH: 0
HEADACHES: 0

## 2022-09-07 ASSESSMENT — ACTIVITIES OF DAILY LIVING (ADL)
ADLS_ACUITY_SCORE: 35
ADLS_ACUITY_SCORE: 39
ADLS_ACUITY_SCORE: 35

## 2022-09-07 NOTE — PHARMACY-ADMISSION MEDICATION HISTORY
Pharmacy Note - Admission Medication History    Pertinent Provider Information:   -Dose was increased for depakote today by neurologist to 1,000 mg but patient has not taken yet      ______________________________________________________________________    Prior To Admission (PTA) med list completed and updated in EMR.       PTA Med List   Medication Sig Last Dose     divalproex sodium extended-release (DEPAKOTE ER) 500 MG 24 hr tablet Take 2 tablets (1,000 mg) by mouth daily (Patient taking differently: Take 500 mg by mouth At Bedtime) 9/6/2022 at hs     guanFACINE HCl (INTUNIV) 4 MG TB24 Take 4 mg by mouth daily 9/7/2022 at am     methylphenidate (CONCERTA) 36 MG CR tablet Take 72 mg by mouth every morning 9/7/2022 at am     methylphenidate (RITALIN LA) 10 MG 24 hr capsule Take 10 mg by mouth daily (with lunch) At noon 9/7/2022 at 1200     mirtazapine (REMERON) 15 MG tablet Take 15 mg by mouth nightly as needed Past Month at prn     risperiDONE (RISPERDAL M-TABS) 1 MG ODT Place 1 mg under the tongue daily as needed (agitation) Unknown at prn     risperiDONE (RISPERDAL) 2 MG tablet Take 2 mg by mouth 3 times daily 9/7/2022 at 1200 x 2       Information source(s): Patient, Family member and CareEverywhere/[facility]  Method of interview communication: in-person    Summary of Changes to PTA Med List  New: -  Discontinued: -  Changed: depakote     Patient was asked about OTC/herbal products specifically.  PTA med list reflects this.    In the past week, patient estimated taking medication this percent of the time:  greater than 90%.    Allergies were reviewed, assessed, and updated with the patient.      Patient does not use any multi-dose medications prior to admission.    The information provided in this note is only as accurate as the sources available at the time of the update(s).    Thank you for the opportunity to participate in the care of this patient.    ARANZA CARDONA RPH  9/7/2022 5:30 PM

## 2022-09-07 NOTE — H&P
[facility]    History and Physical - Hospitalist Service       Date of Admission:  9/7/2022    Assessment & Plan      Toshia Young is a 32 year old female with PMH fetal alcohol syndrome with developmental delay, ADHD, and recent diagnosis of seizure disorder 8/12 that was admitted on 9/7/2022.  She presented to the ED with progressively more frequent seizures over the past week and had six-seven episodes on the day of admission.         Recent Dx of complex partial seizures 8/14/22  Multiple seizure episodes in 24 hours  Patient recently diagnosed with complex partial seizures, negative CT brain; EEG previous admission showed left temporal sharp discharge epileptiform discharge low theres hold for seizure . Diagnosis given approximately 1 month ago. She was subsequently started on keppra and due to poor tolerance ( sleepy and reports of draggine RT foot ) she was switched by her neurologist Dr. Strickland a week ago to depakote 1000 mg which she was only taking 500 mg daily. Patient started gradually have seizures almost daily over the past week and progressd to around 6-7 seizures today. she was was vitally stable, on admission on RA afebrile. Physical exam: normal neurological exam, normal cardiac and respiratory exam. Multiple enlarged lymph nodes cervical/post pre auricular, inguinal. Labs  is wnl, including wbc, electorlytes, normal EKG. Except for a subtherapeutic valproic level, otherwise normal CT head and normal recent MRI 08/31  Recent seizures likely related to recent changes in seizure meds and errors with dosing given the subtherapeutic level of valproic acid. Negative work up makes other differentials less likely to be the cause including acute CNS infection, electrolyte imbalance or acute intracranial acute process.  - Started loading dose on 1 g valproate in ED.  - Holding PTA methylphenidate for possibly decreasing seizure threshold.  - Consider start her back on PTA  Depakote in the AM.  - Consider PRN ativan for breakthough seizures  - Seizure precautions  - Q 4 neuro checks  - Neurology consulted appreciate recs  - Repeat CBC, Bmp in the AM.       Localized Lymphadenopathy  Patient reports noticing mildly painful lumps in the neck that started few weeks ago, and feeling generally fatigued since COVID a month ago, otherwise a febrile,  no symptoms of cough sore throat shortness of breath night sweats or weight loss.  Physical exam :  small tender lymph nodes distributed along asymmetrically B/L cervical and occipital left and right cervical areas, 1-2 inguinal lymph nodes. Patient has no signs or symptoms suggestive of localized infections or malignancy, so most likely reactive or could be a viral etiology such as HIV, EBV. Recent medication; Less likely malignancy or autoimmune at this point.  -consider further workup of viral causes HIV,or U/S of neck lymph nodes if progressing.      Chronic conditions:   ADHD- hold PTA mirtazapine, risperidone, methylphenidate overnight  Will consider restarting in the AM.               Diet:  Regular adult diet  DVT Prophylaxis: Low Risk/Ambulatory with no VTE prophylaxis indicated  [place] Catheter: Not present  Fluids: NONE  Central Lines: None  Cardiac Monitoring: None  Code Status:  full code    Clinically Significant Risk Factors Present on Admission                          Disposition Plan         The patient's care was discussed with the Attending Physician, Dr. Swann.    CHRISSY Stroud  [facility] PGY1  [facility]  Securely message with the Vocera Web Console (learn more here)  Text page via [employer] Paging/Directory       ______________________________________________________________________    Chief Complaint    Seizures  Enlarged lymph nodes       History is obtained from the patient and her mother.    History of Present Illness          Toshia Young is a 32 year old female with  "PMH fetal alcohol syndrome with developmental delay, ADHD, and recent seizure disorder diagnosis about a month ago; yesterday came in with 6 episodes of seizures earlier today..    Mom reports about a month ago she got covid and recent hospitalization 8/12 for seizure like activity  she was started on keppra 500 2x on 14th after a negative CT brain and an EEG positive for seizure activity. She couldn't tolerate was keppra mom reports she was \"snowed\" drowsy a lot and noticed that she was dragging her right leg and so called neurologist ; ordered MRI that was normal and switched her to Depakote ( valproic acid) 1000 mg per chart. But mom was giving 500mg at night. In the ED  pill bottle clearly said 500 mg daily. Also mom initially held off starting medication right away thought wasn't sure if seizures will return. then last Tue she had  had episode seizure, almost daily afterwards except for a day of two in between.     Patient complains of fatigue, and gets headache before the seizures according to mom her seziures were initially short tonic- clonic with periods in between were she regains consciousness is confused for a short time and is back to baseline quickly. Yesterday first few episodes were 3-4 minutes and started.getting longer last one lasted 8 mintues until EMS arrived.    Patient has swollen lymph nodes all over her neck for the past few weeks, tender otherwise no sore throat no fever, night sweats, weight loss, no cough or sob. No rash.      Review of Systems    Positive for voice changes pt and mom report \" radha mouse voice\"  Gait problem (Right foot dragging 2-3 weeks)  The 10 point Review of Systems is negative other than noted in the HPI or here.     Past Medical History    I have reviewed this patient's medical history and updated it with pertinent information if needed.   Past Medical History:   Diagnosis Date     Fetal alcohol syndrome     ADHD    Past Surgical History   I have reviewed " this patient's surgical history and updated it with pertinent information if needed.  History reviewed. No pertinent surgical history.     Social History   I have reviewed this patient's social history and updated it with pertinent information if needed. Toshia Young  lives with adoptive parents and 3 adult siblings. No hx smoking, alcohol or illicit drug use.     Family History     No significant family history, including no history of: patient adopted.    Prior to Admission Medications   Prior to Admission Medications   Prescriptions Last Dose Informant Patient Reported? Taking?   divalproex sodium extended-release (DEPAKOTE ER) 500 MG 24 hr tablet 9/6/2022 at hs  No Yes   Sig: Take 2 tablets (1,000 mg) by mouth daily   Patient taking differently: Take 500 mg by mouth At Bedtime   guanFACINE HCl (INTUNIV) 4 MG TB24 9/7/2022 at am  Yes Yes   Sig: Take 4 mg by mouth daily   methylphenidate (CONCERTA) 36 MG CR tablet 9/7/2022 at am  Yes Yes   Sig: Take 72 mg by mouth every morning   methylphenidate (RITALIN LA) 10 MG 24 hr capsule 9/7/2022 at 1200  Yes Yes   Sig: Take 10 mg by mouth daily (with lunch) At noon   mirtazapine (REMERON) 15 MG tablet Past Month at prn  Yes Yes   Sig: Take 15 mg by mouth nightly as needed   risperiDONE (RISPERDAL M-TABS) 1 MG ODT Unknown at prn  Yes Yes   Sig: Place 1 mg under the tongue daily as needed (agitation)   risperiDONE (RISPERDAL) 2 MG tablet 9/7/2022 at 1200 x 2  Yes Yes   Sig: Take 2 mg by mouth 3 times daily      Facility-Administered Medications: None     Allergies   Allergies   Allergen Reactions     Sulfa Drugs Rash       Physical Exam   Vital Signs: Temp: 98.6  F (37  C) Temp src: Oral BP: 122/60 Pulse: 67   Resp: 16 SpO2: 98 %      Weight: 117 lbs 0 oz    Constitutional: awake,appeared fatigued,very interactive and answers questions appropriately.  HEENT: normocephalic, atraumatic,sclera clear, conjunctiva normal.normal oropharynx. Neck supple.  Hematologic /  Lymphatic: soft, small, mobile, tender,small  cervical lymphadenopathy noted RT anterior; RT cynthia/postauricular submandibular and left posterior cervical/postauricualr and occipital RT inguinal lymphadnopathy noted 2 small tender lymph nodes 1cm, felt tenderness in the Left no palpable lymph nodes. No palpable axillary or supraclavicular lymph nodes.   Respiratory: No increased work of breathing,  clear to auscultation bilaterally, no crackles or wheezing.  Cardiovascular: regular rate and rhythm, normal S1 and S2,  and no murmur noted.  GI:  normal bowel sounds, soft, non-distended, non-tender, no masses palpated.   Skin: old hyperpigmentation old bruises on right leg. Rash on posterior neck along hairline. psoriasis.  Musculoskeletal: There is no redness, warmth, or swelling of the joints. strength is 5 out of 5 all extremities bilaterally.  Tone is normal.  Neurologic: Cranial nerves II-XII are grossly intact.  Motor is 5 out of 5 bilaterally. Sensory is intact.  Babinski down going.    Data   Data reviewed today: I reviewed all medications, new labs and imaging results over the last 24 hours. I personally reviewed the head CT image(s) showing normal with no acute intracranial pathology..    Recent Labs   Lab 09/07/22  1501   WBC 6.1   HGB 13.1   MCV 86         POTASSIUM 3.8   CHLORIDE 105   CO2 25   BUN 16   CR 0.66   ANIONGAP 9   TRISTAN 9.3   GLC 89   ALBUMIN 3.7   PROTTOTAL 7.5   BILITOTAL 0.7   ALKPHOS 77   ALT 16   AST 18     Most Recent 3 CBC's:Recent Labs   Lab Test 09/07/22  1501 08/13/22  0708 08/12/22  1222   WBC 6.1 5.3 4.2   HGB 13.1 14.4 13.3   MCV 86 89 87    188 191     Most Recent 3 BMP's:Recent Labs   Lab Test 09/07/22  1501 08/13/22  0708 08/12/22  1222    139 138   POTASSIUM 3.8 4.0 3.8   CHLORIDE 105 108* 106   CO2 25 23 26   BUN 16 11 15   CR 0.66 0.79 0.77   ANIONGAP 9 8 6   TRISTAN 9.3 8.9 9.0   GLC 89 76 93     Most Recent 3 INR's:Recent Labs   Lab Test 08/12/22  1222    INR 1.09     Most Recent 3 Creatinines:Recent Labs   Lab Test 09/07/22  1501 08/13/22  0708 08/12/22  1222   CR 0.66 0.79 0.77     Recent Results (from the past 24 hour(s))   Head CT w/o contrast    Narrative    EXAM: CT HEAD W/O CONTRAST  LOCATION: [facility]  DATE/TIME: 9/7/2022 4:56 PM    INDICATION: multiple seizures  COMPARISON: MRI 08/31/2022  TECHNIQUE: Routine CT Head without IV contrast. Multiplanar reformats. Dose reduction techniques were used.    FINDINGS:  INTRACRANIAL CONTENTS: No intracranial hemorrhage, extraaxial collection, or mass effect.  No CT evidence of acute infarct. Normal parenchymal attenuation. Normal ventricles and sulci.     VISUALIZED ORBITS/SINUSES/MASTOIDS: No intraorbital abnormality. No paranasal sinus mucosal disease. No middle ear or mastoid effusion.    BONES/SOFT TISSUES: No acute abnormality.      Impression    IMPRESSION:  1.  Normal head CT.

## 2022-09-07 NOTE — TELEPHONE ENCOUNTER
Patients father, Bruno, informed NAI WNL  No further questions at this time  Sandee Tariq, CMA on 9/7/2022 at 11:06 AM

## 2022-09-07 NOTE — ED PROVIDER NOTES
EMERGENCY DEPARTMENT ENCOUNTER      NAME: Toshia Young  AGE: 32 year old female  YOB: 1990  MRN: [id number]  EVALUATION DATE & TIME: 2022  2:54 PM    PCP: No Ref-Primary, Physician    ED PROVIDER: Jonathan Cuevas DO      Chief Complaint   Patient presents with     Seizures         FINAL IMPRESSION:  1. Seizure disorder (H)    2. Enlarged lymph node in neck          ED COURSE & MEDICAL DECISION MAKIN-year-old female who was diagnosed with a seizure disorder approximately 1 month ago was brought in by EMS after she experienced approximately 6 seizures earlier today.  Patient was recently switched from Keppra to Depakote.  The mom states that she has been administering the 500 mg of Depakote as directed every night.  Upon arrival to the ED the patient was hemodynamically stable.  The patient complained of fatigue and swollen lymph nodes within her neck which have been ongoing for over a week.  She denies any headaches, chest pain, shortness breath, abdominal pain, or any nausea or vomiting.  The patient was noted to have cervical lymphadenopathy on exam but the remainder of her physical exam was unremarkable.    An EKG was obtained which revealed normal sinus rhythm without any new or concerning ST or T wave changes.    Following her initial evaluation the patient's case was discussed with the on-call neurologist.  Head CT scan, ammonia level, valproic acid administration if subtherapeutic, and admission were recommended.      Head CT scan was nondiagnostic.  CBC, BMP, hepatic panel, magnesium, and ammonia levels were all unremarkable.  The patient's Depakote level was subtherapeutic at 48.  This is the likely cause or least a significant contributing factor to the multiple seizures that she experienced earlier today.  The patient's mother brought in the pill bottle which clearly states that she was to receive 500 mg of Depakote at night which is different from what the patient's medication  record currently states in [facility].      The patient was given a 1 g IV dose of valproic acid here in the ED.      The patient was then admitted to the [facility] resident service for further evaluation and treatment of the seizure disorder.      Pertinent Labs & Imaging studies reviewed. (See chart for details)  3:24 PM I met with the patient to gather history and to perform my initial exam. We discussed plans for the ED course, including diagnostic testing and treatment.   3:59 PM Spoke to neurology, Dr. Jensen.   5:26 PM Spoke to neurology, Dr. Jensen.  5:28 PM Spoke to [place], Dr. Osmund.      At the conclusion of the encounter I discussed the results of all of the tests and the disposition. The questions were answered. The patient or family acknowledged understanding and was agreeable with the care plan.       PPE worn: n95 mask, goggles    MEDICATIONS GIVEN IN THE EMERGENCY:  Medications   valproate (DEPACON) 1,000 mg in sodium chloride 0.9 % 100 mL intermittent infusion (1,000 mg Intravenous Given 9/7/22 1800)       NEW PRESCRIPTIONS STARTED AT TODAY'S ER VISIT  New Prescriptions    No medications on file          =================================================================    HPI    Patient information was obtained from: patient's mom    Use of : N/A        Toshia MATHIS Hector is a 32 year old female with a pertinent history of complex partial seizure, unresponsive episode, fetal alcohol syndrome, and microencephaly who presents to this ED by EMS for evaluation of seizures.     In early August 2022 (1 month ago), the patient's sister tested positive for Covid. The patient and her mom subsequently contracted Covid that same day. They were getting ready to spend time reading and after they sat down, the patient's mom witnessed the patient have her first seizure. She was admitted to the hospital at this time and put on keppra, which the patient did not tolerate. A couple weeks ago, patient  "switched to depakote. Last week, the patient had 3 catatonic seizures (1 each on 9/1, 9/2, 9/3). The patient did not have any seizures 9/4-9/6. Since 11:30 today, the patient has had a total of 7 seizures, ranging from 45 seconds to 7 minutes. She was actively seizing when EMS arrived. Patient's mom called Dr. Strickland, who told to present to the ED.     The patient's mom also reports that since the patient contracted Covid, she has been having swelling on her lymph nodes and developed 9 bumps around the back of her neck. The patient's voice has also changed and states she now \"sounds like Jon Mouse\" and the patient has been dragging her right foot while walking. MRI was performed and they were called today with a normal result. The patient recently stopped taking her sleep medication due to her seizures. The patient is fatigued but has not been experiencing headache, chest pain, abdominal pain, shortness of breath, nausea, vomiting, or any other complaints at this time.     REVIEW OF SYSTEMS   Review of Systems   HENT: Positive for voice change (\"sounds like Jon Mouse\").    Respiratory: Negative for shortness of breath.    Cardiovascular: Negative for chest pain.   Gastrointestinal: Negative for abdominal pain, nausea and vomiting.   Musculoskeletal: Positive for gait problem (dragging right foot).   Skin:        Positive for raises (neck, secondary to lymph node swelling).    Neurological: Positive for seizures. Negative for headaches.   All other systems reviewed and are negative.      PAST MEDICAL HISTORY:  Past Medical History:   Diagnosis Date     Fetal alcohol syndrome        PAST SURGICAL HISTORY:  History reviewed. No pertinent surgical history.        CURRENT MEDICATIONS:    divalproex sodium extended-release (DEPAKOTE ER) 500 MG 24 hr tablet  guanFACINE HCl (INTUNIV) 4 MG TB24  methylphenidate (CONCERTA) 36 MG CR tablet  methylphenidate (RITALIN LA) 10 MG 24 hr capsule  mirtazapine (REMERON) 15 MG " tablet  risperiDONE (RISPERDAL M-TABS) 1 MG ODT  risperiDONE (RISPERDAL) 2 MG tablet        ALLERGIES:  Allergies   Allergen Reactions     Sulfa Drugs Rash       FAMILY HISTORY:  History reviewed. No pertinent family history.    SOCIAL HISTORY:   Social History     Socioeconomic History     Marital status: Single     Spouse name: None     Number of children: None     Years of education: None     Highest education level: None       VITALS:  /67   Pulse 69   Temp 98.6  F (37  C) (Oral)   Resp (!) 33   Wt 53.1 kg (117 lb)   LMP 09/02/2022   SpO2 99%     PHYSICAL EXAM    General presentation: Alert, Vital signs reviewed. NAD. Fatigued appearing.   HENT: ENT inspection is normal. Oropharynx is moist and clear.   Eye: Pupils are equal and reactive to light. EOMI  Neck: Posterior cervical lymphadenopathy. The neck is supple, with full ROM, with no evidence of meningismus.  Pulmonary: Currently in no acute respiratory distress. Normal, non labored respirations, the lung sounds are normal with good equal air movement. Clear to auscultation bilaterally.   Circulatory: Regular rate and rhythm. Peripheral pulses are strong and equal. No murmurs, rubs, or gallops.   Abdominal: The abdomen is soft. Nontender. No rigidity, guarding, or rebound. Bowel sounds normal.   Neurologic: Alert, oriented to person, place, and time. No motor deficit. No sensory deficit. Cranial nerves II through XII are intact.  Musculoskeletal: No extremity tenderness. Full range of motion in all extremities. No extremity edema.   Skin: Skin color is normal. No rash. Warm. Dry to touch.      LAB:  All pertinent labs reviewed and interpreted.  Results for orders placed or performed during the hospital encounter of 09/07/22   Head CT w/o contrast    Impression    IMPRESSION:  1.  Normal head CT.   Extra Blue Top Tube   Result Value Ref Range    Hold Specimen [facility]    Extra Red Top Tube   Result Value Ref Range    Hold Specimen [facility]    Extra Green Top  (Lithium Heparin) Tube   Result Value Ref Range    Hold Specimen [facility]    Extra Purple Top Tube   Result Value Ref Range    Hold Specimen [facility]    Comprehensive metabolic panel   Result Value Ref Range    Sodium 139 136 - 145 mmol/L    Potassium 3.8 3.5 - 5.0 mmol/L    Chloride 105 98 - 107 mmol/L    Carbon Dioxide (CO2) 25 22 - 31 mmol/L    Anion Gap 9 5 - 18 mmol/L    Urea Nitrogen 16 8 - 22 mg/dL    Creatinine 0.66 0.60 - 1.10 mg/dL    Calcium 9.3 8.5 - 10.5 mg/dL    Glucose 89 70 - 125 mg/dL    Alkaline Phosphatase 77 45 - 120 U/L    AST 18 0 - 40 U/L    ALT 16 0 - 45 U/L    Protein Total 7.5 6.0 - 8.0 g/dL    Albumin 3.7 3.5 - 5.0 g/dL    Bilirubin Total 0.7 0.0 - 1.0 mg/dL    GFR Estimate >90 >60 mL/min/1.73m2   Result Value Ref Range    Magnesium 2.0 1.8 - 2.6 mg/dL   Valproic acid (Depakote level)   Result Value Ref Range    Valproic acid 48.2   ug/mL   CBC with platelets and differential   Result Value Ref Range    WBC Count 6.1 4.0 - 11.0 10e3/uL    RBC Count 4.33 3.80 - 5.20 10e6/uL    Hemoglobin 13.1 11.7 - 15.7 g/dL    Hematocrit 37.3 35.0 - 47.0 %    MCV 86 78 - 100 fL    MCH 30.3 26.5 - 33.0 pg    MCHC 35.1 31.5 - 36.5 g/dL    RDW 11.7 10.0 - 15.0 %    Platelet Count 200 150 - 450 10e3/uL    % Neutrophils 60 %    % Lymphocytes 33 %    % Monocytes 5 %    % Eosinophils 1 %    % Basophils 1 %    % Immature Granulocytes 0 %    NRBCs per 100 WBC 0 <1 /100    Absolute Neutrophils 3.7 1.6 - 8.3 10e3/uL    Absolute Lymphocytes 2.0 0.8 - 5.3 10e3/uL    Absolute Monocytes 0.3 0.0 - 1.3 10e3/uL    Absolute Eosinophils 0.1 0.0 - 0.7 10e3/uL    Absolute Basophils 0.0 0.0 - 0.2 10e3/uL    Absolute Immature Granulocytes 0.0 <=0.4 10e3/uL    Absolute NRBCs 0.0 10e3/uL   Ammonia (on ice)   Result Value Ref Range    Ammonia 35 11 - 35 umol/L       RADIOLOGY:  Reviewed all pertinent imaging. Please see official radiology report.  Head CT w/o contrast   Final Result   IMPRESSION:   1.  Normal head CT.          EKG:    Normal  sinus rhythm.  Rate of 67.  Normal QRS.  Normal QT.  No ST or T wave changes.  Compared to EKG on 8/12/2022 no significant changes noted.    I have independently reviewed and interpreted the EKG(s) documented above.    PROCEDURES:   None      I, Kallie Son , am serving as a scribe to document services personally performed by Jonathan Cuevas DO based on my observation and the provider's statements to me. I, Jonathan Cuevas, attest that Kallie Son is acting in a scribe capacity, has observed my performance of the services and has documented them in accordance with my direction.    Jonathan Cuevas DO  Emergency Medicine  [facility] EMERGENCY DEPARTMENT  [facility] [facility] [id number] [phone]     Jonathan Cuevas DO  09/07/22 7778

## 2022-09-07 NOTE — ED NOTES
Bed: JNED-01  Expected date: 9/7/22  Expected time: 2:49 PM  Means of arrival: Ambulance  Comments:  Seizures new diagnosed had 6 today

## 2022-09-07 NOTE — ED TRIAGE NOTES
The pt arrives via EMS from home after having a total of 7 seizures today. Was dx with COVID on 8/12/2022 and that's when the seizures started. The seizures are tonic clonic, last one lasted 3 minutes for EMS, was given IV Versed 1mg.  for EMS.     Triage Assessment     Row Name 09/07/22 1456       Triage Assessment (Adult)    Airway WDL WDL       Cognitive/Neuro/Behavioral WDL    Cognitive/Neuro/Behavioral WDL WDL

## 2022-09-07 NOTE — TELEPHONE ENCOUNTER
I called mom and Toshia was just picked up via ambulance to [facility]. If warranted, [facility] will consult with neuro on call  Sandee Tariq CMA on 9/7/2022 at 3:00 PM    Per Dr. Strickland:       Carson Strickland MD  You 14 minutes ago (2:45 PM)       Increase Depakote to 1000 mg at bedtime.  I have sent a new prescription to patient's pharmacy.  After 2 weeks we need valproic acid level.  If she has another seizure today she needs to go to the ER.

## 2022-09-07 NOTE — TELEPHONE ENCOUNTER
"Call from mom, Kirti, see triage note.  Best number to reach her this afternoon [phone]  Call to [facility], spoke with Isaura, high priority triage note to clinic  Nurse Triage SBAR    Is this a 2nd Level Triage? YES, LICENSED PRACTITIONER REVIEW IS REQUIRED    Situation:   has had 4 generalized seizures today, has postictal symptoms    Background:   Seizures started after COVID positive 8/12/22    Assessment:   follow up per neurology team today    Protocol Recommended Disposition:   See in Office Today or Tomorrow        Reason for Disposition    Patient wants to be seen    Additional Information    Negative: First seizure ever    Negative: Seizure lasts > 5 minutes    Negative: Two or more seizures and stays confused between seizures    Negative: Bluish (or gray) lips or face    Negative: Head injury caused the seizure    Negative: Pregnant or postpartum (from 0 to 6 weeks after delivery)    Negative: Known poisoning or overdose    Negative: Seizure in a swimming pool    Negative: Diabetes mellitus  (Exception: Now alert, acting normal, and has normal blood glucose [e.g., > 70 mg/dL or 3.9 mmol/L].)    Negative: Unresponsive (can't be awakened) after the seizure stops and persists > 5 minutes    Negative: Acting confused (e.g., disoriented, slurred speech) after the seizure stops and persists > 30 minutes    Negative: Sounds like a life-threatening emergency to the triager    Negative: SEVERE headache  (Note: Most people have a headache after a seizure.)    Negative: Second seizure occurs on the same day    Negative: Fever > 100.4 F (38.0 C)    Negative: Substance use (drug use) or unhealthy alcohol use, known suspected    Negative: Wants to sleep after the seizure and persists much longer than usual    Negative: Patient sounds very sick or weak to the triager    Negative: Not on or ran out of seizure medicine (anticonvulsant)    Answer Assessment - Initial Assessment Questions  1. ONSET: \"How long " "did the seizure last?\" (e.g., seconds, minutes)       Has had 4 seizures today lasting 30 seconds first one.  Last one 90 seconds  2. CONTENT: \"Describe what happened during the seizure. Did the body become stiff? Was there any jerking?\"       Full generalized seizure, all extremities become regid, not responsive, right side of mouth droops  3. CIRCUMSTANCE: \"What was the person doing when the seizure began?\"       Not doing anything  4. MENTAL STATUS: \"Does the person know who they are, who you are, and where they are?\"       Family has not \"quizzed her\"  Not certain of status  5. PRIOR SEIZURES: \"Has the person had a seizure (convulsion) before?\" If Yes, ask: \"When was the last time?\" and \"What happened last time?\"      Had seizures, one on Friday, one on Saturday, each 30 seconds.  None on Sunday Monday or Tuesdays.  Seizure activity began after COVID positive 8/12/22  6. EPILEPSY: \"Does the person have epilepsy?\" Note: Check for medical ID bracelet.      no  7. MEDICINES: \"Does the person take anticonvulsant medications?\" (e.g., Yes, No; compliance, any recent changes)      Is taking depakote, not quite 2 weeks on med  8. INJURY: \"Did the person hurt himself during the seizure?\" (e.g., head, tongue)      No injury, is on her mattress which is on the floor.  Family always with her  9. OTHER SYMPTOMS: \"Are there any other symptoms?\" (e.g., fever, headache)      Has enlarged lymph nodes.  Did tell mom she had a headache this morning  10. PREGNANCY: \"Is there any chance you are pregnant?\" \"When was your last menstrual period?\"        N/A    Protocols used: [id number]      "

## 2022-09-08 ENCOUNTER — APPOINTMENT (OUTPATIENT)
Dept: NEUROLOGY | Facility: HOSPITAL | Age: 32
End: 2022-09-08
Attending: PSYCHIATRY & NEUROLOGY
Payer: MEDICAID

## 2022-09-08 LAB
AMMONIA PLAS-SCNC: 47 UMOL/L (ref 11–35)
ANION GAP SERPL CALCULATED.3IONS-SCNC: 8 MMOL/L (ref 5–18)
BUN SERPL-MCNC: 14 MG/DL (ref 8–22)
CALCIUM SERPL-MCNC: 9.4 MG/DL (ref 8.5–10.5)
CHLORIDE BLD-SCNC: 106 MMOL/L (ref 98–107)
CO2 SERPL-SCNC: 24 MMOL/L (ref 22–31)
CREAT SERPL-MCNC: 0.71 MG/DL (ref 0.6–1.1)
ERYTHROCYTE [DISTWIDTH] IN BLOOD BY AUTOMATED COUNT: 11.9 % (ref 10–15)
GFR SERPL CREATININE-BSD FRML MDRD: >90 ML/MIN/1.73M2
GLUCOSE BLD-MCNC: 75 MG/DL (ref 70–125)
HCT VFR BLD AUTO: 40.4 % (ref 35–47)
HGB BLD-MCNC: 13.8 G/DL (ref 11.7–15.7)
MCH RBC QN AUTO: 29.7 PG (ref 26.5–33)
MCHC RBC AUTO-ENTMCNC: 34.2 G/DL (ref 31.5–36.5)
MCV RBC AUTO: 87 FL (ref 78–100)
PLATELET # BLD AUTO: 196 10E3/UL (ref 150–450)
POTASSIUM BLD-SCNC: 4.2 MMOL/L (ref 3.5–5)
RBC # BLD AUTO: 4.65 10E6/UL (ref 3.8–5.2)
SODIUM SERPL-SCNC: 138 MMOL/L (ref 136–145)
WBC # BLD AUTO: 5.7 10E3/UL (ref 4–11)

## 2022-09-08 PROCEDURE — 36415 COLL VENOUS BLD VENIPUNCTURE: CPT

## 2022-09-08 PROCEDURE — G0378 HOSPITAL OBSERVATION PER HR: HCPCS

## 2022-09-08 PROCEDURE — 36415 COLL VENOUS BLD VENIPUNCTURE: CPT | Performed by: PSYCHIATRY & NEUROLOGY

## 2022-09-08 PROCEDURE — 99417 PROLNG OP E/M EACH 15 MIN: CPT | Performed by: PSYCHIATRY & NEUROLOGY

## 2022-09-08 PROCEDURE — 99215 OFFICE O/P EST HI 40 MIN: CPT | Performed by: PSYCHIATRY & NEUROLOGY

## 2022-09-08 PROCEDURE — 250N000013 HC RX MED GY IP 250 OP 250 PS 637

## 2022-09-08 PROCEDURE — 82140 ASSAY OF AMMONIA: CPT | Performed by: PSYCHIATRY & NEUROLOGY

## 2022-09-08 PROCEDURE — 85027 COMPLETE CBC AUTOMATED: CPT

## 2022-09-08 PROCEDURE — 95816 EEG AWAKE AND DROWSY: CPT | Mod: 26 | Performed by: PSYCHIATRY & NEUROLOGY

## 2022-09-08 PROCEDURE — 95819 EEG AWAKE AND ASLEEP: CPT

## 2022-09-08 PROCEDURE — 99219 PR INITIAL OBSERVATION CARE,LEVEL II: CPT | Mod: GC

## 2022-09-08 PROCEDURE — 80048 BASIC METABOLIC PNL TOTAL CA: CPT

## 2022-09-08 RX ORDER — LORAZEPAM 2 MG/ML
2 INJECTION INTRAMUSCULAR
Status: DISCONTINUED | OUTPATIENT
Start: 2022-09-08 | End: 2022-09-09 | Stop reason: HOSPADM

## 2022-09-08 RX ORDER — DIVALPROEX SODIUM 500 MG/1
1000 TABLET, EXTENDED RELEASE ORAL DAILY
Status: DISCONTINUED | OUTPATIENT
Start: 2022-09-08 | End: 2022-09-09 | Stop reason: HOSPADM

## 2022-09-08 RX ADMIN — DIVALPROEX SODIUM 1000 MG: 500 TABLET, FILM COATED, EXTENDED RELEASE ORAL at 10:56

## 2022-09-08 ASSESSMENT — ACTIVITIES OF DAILY LIVING (ADL)
ADLS_ACUITY_SCORE: 39
ADLS_ACUITY_SCORE: 45
ADLS_ACUITY_SCORE: 39
ADLS_ACUITY_SCORE: 45
ADLS_ACUITY_SCORE: 45
ADLS_ACUITY_SCORE: 43
ADLS_ACUITY_SCORE: 45
ADLS_ACUITY_SCORE: 45
ADLS_ACUITY_SCORE: 40
ADLS_ACUITY_SCORE: 45

## 2022-09-08 NOTE — CONSULTS
[facility]    Patient Name:  Toshia Young  MRN:  [id number]    :  1990  Date of Service:  2022  Primary care provider:  Emilia Ref-Primary, Physician      Neurology consultation service was asked to see Toshia Young by Dr. Kelly to evaluate seizures.    Chief Complaint:  Seizures    History of Present Illness:   Toshia Young is a 32 year old female with history of fetal alcohol syndrome, ADHD, agitation who presents with concern for seizures.  History is obtained primarily from patient's mother who is an excellent historian.  No fall or self provide some additional history as well as the chart.    They state that she had been relatively healthy until her diagnosis of COVID on .  She had no history of seizures and her mother considers her one of the more stable children that she helps take care of.  No difficulties with walking or speech changes at baseline.  Around the time from when she had an episode that is better described as syncope.  She described her as being limp on the floor.  There was no tonic-clonic movements noted.  She had a normal MRI, but there was some concern for left hand tremors and so an EEG was obtained.  EEG showed right temporal spikes that resulted after she had discharge.  Ultimately she was started on Keppra 1000 mg.  They state after discharge she was significantly sedated on the Keppra.  She could not function was having difficulty walking.  She was off of antiepileptics for about 5 days, with some mild improvement and no symptoms noted.    Mother states she has her first generalized tonic-clonic seizure.  Like all of her events she complained of a severe headache prior.  She is noted to be pale at onset.  No other focal features were noted with onset such as tonic eye deviation or head version.  Her mother who works with developmentally disabled children and states she has witnessed many  seizures, feels confident was a generalized tonic-clonic seizure.  States it only lasted about 30 seconds and there was no postictal state.  3 days later she had another start seizure.  She was started on Depakote 500 mg around that time.  The day after that had another short seizure.  In the 3 days prior to admission her mother states it is the best she had ever been.  She is walking better, talking better, and did not have any seizures.  However than yesterday she had another one of her similar events.  Started with a severe headache, and pallor.  Subsequently had generalized tonic-clonic shaking.  This recurred at least 6 times.  They spoke to Dr. Strickland who recommended increasing Depakote to 1000 mg, however ambulance was called prior to any extra Depakote being given.  Last night in the ED she was noted to have another generalized tonic-clonic seizure x2 and was ultimately given Ativan.    Her mother states she had a very abnormal birth history.  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  Mother was reportedly a daily cocaine user.  Despite that she had been doing relatively well and had some developmental delay but mostly interacted appropriately.  She is not working but states she needs hats for the homeless.  There is no reported history of brain tumors, intracranial infections, or family history of seizures.    ROS  A comprehensive ROS was performed and pertinent findings were included in HPI.     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 No pertinent surgical history.    Medications   I have personally reviewed the patient's medication list.     Allergies  I have personally reviewed the patient's allergy list.     Social History  Denies alcohol, tobacco, drug use.    Family History    Family history not well known is adopted  "but no known family history of seizure disorder.    Physical Examination   Vitals: /69 (BP Location: Right arm, Patient Position: Supine)   Pulse 82   Temp 97.9  F (36.6  C) (Oral)   Resp 18   Ht 1.625 m (5' 3.98\")   Wt 53.1 kg (117 lb)   LMP 09/02/2022   SpO2 98%   BMI 20.10 kg/m    General: Lying in bed, NAD  Head: NC/AT  Eyes: no icterus, op pink and moist  Cardiac: RRR, extremities warm, distal pulses intact  Respiratory: non-labored on RA  GI: S/NT/ND  Skin: No rash or lesion on exposed skin, cervical lymphadenopathy seen  Psych: Mood pleasant.  Neuro:  Mental status: Awake, alert, attentive, oriented to to person, hospital, month.  Able to name the president.  Able to follow two-step cross commands.  Speech is fluent and she is able to name and repeat.    Cranial nerves: , PERRL, conjugate gaze, EOMI, facial sensation intact, face symmetric, shoulder shrug strong, tongue/uvula midline, no dysarthria but has almost cartoon like appearance to her voice which is new since her COVID diagnosis.  Motor: Normal bulk and tone. No abnormal movements.  5 out of 5 strength in the bilateral upper extremities.  Left lower extremity with 5 out of 5 strength throughout.   Right lower extremity has prominent giveaway weakness in most muscle groups.  At least 4+ out of 5 strength throughout.  She does have a positive Valentin's and a positive hip abduction sign on the right.    Reflexes: Deep tendon reflexes are 1+ and symmetric throughout upper and lower extremities.  Toes are downgoing.  Negative Leah's.  No clonus.    Sensory: I tactile light touch throughout upper and lower extremities.  No sensory extinction s   Coordination: Finger-nose-finger intact without ataxia or dysmetria.  Heel-to-shin with some dysmetria noted bilaterally, with coaching can do all     Gait: Normal width, decent stride length.  Heel to heel distance is normal.  She does appear mildly unsteady, but is able to ambulate " independently.    Investigations   I have personally reviewed pertinent labs, tests, and radiological imaging. Discussion of notable findings is included under Impression.     MRI yesterday is personally reviewed and no significant abnormality noted    Was patient transferred from outside hospital?   No    Impression  #Seizure activity  #Fetal alcohol syndrome  #Developmental delay    Ms. Young is a 32-year-old female with very abnormal birth history outlined above who presents for concern for seizures.  Her mother provides  History and is witnessed generalized tonic-clonic seizures in the past and feels these are consistent with them.  It is quite unusual as she has not actually had a definite seizure until August 30 despite the abnormal EEG that was done prior.  Previous events had almost no postictal state.  Her exam shows a positive Valentin sign and hip abduction sign on the right.  I am not entirely convinced there is any neurologic weakness of the right lower extremity.  In addition her speech pattern is that consistent with a known speech disorder.  These findings in the setting of a relatively explosive presentation of seizures are concerning for psychogenic nonepileptic seizures.  That being said she certainly does have risk factors for seizures pretty good history of generalized tonic-clonic seizures.  She is not on a therapeutic dose of Depakote prior to this.  If she is stable for 24 hours on her current dose of Depakote I do not think additional work-up is needed.  That being said if she continues to have daily events, I do think these do require clarification with a long-term video EEG.  This may require transfer to the ICU.  If they are psychogenic nonepileptic events focus of the psychiatric comorbidities.  If these are seizures given the relatively explosive presentation I do think a lumbar puncture and consideration of an autoimmune epilepsy panel would be indicated.    Recommendations  -Routine  EEG today  -Continue Depakote 1000 mg daily  -If patient seizure-free over the next 24 hours, do not think additional work-up is noted on her more therapeutic dose of Depakote  -If continues to have daily or more frequent seizure-like activity will require long-term video EEG to help clarify underlying diagnosis.  Of note this may require transfer to the ICU  - Neurology to continue to follow    Thank you for involving Neurology in the care of Toshia DEL Young.       Chelsi Jensen, DO      My total floor time today for this patient was at least 110 minutes, greater than half of which was for counseling and coordination of care

## 2022-09-08 NOTE — PROGRESS NOTES
[facility]    Medicine Progress Note - Hospitalist Service    Date of Admission:  9/7/2022    Assessment & Plan          Toshia Young is a 32 year old female with PMH fetal alcohol syndrome with developmental delay, ADHD, and recent diagnosis of seizure disorder 8/12 that was admitted on 9/7/2022.  She presented to the ED with progressively more frequent seizures over the past week and had six-seven episodes on the day of admission. Patient had 3 more seizures overnight, given ativan x1. Currently alert, oriented x3, no focal neurological deficits on gross examination, no injuries noted.         Recent Dx of complex partial seizures 8/14/22  Multiple seizure episodes in 24 hours  Patient recently diagnosed with complex partial seizures, negative CT brain; EEG previous admission showed left temporal sharp discharge epileptiform discharge low theres hold for seizure. Diagnosis given approximately 1 month ago. She was subsequently started on keppra and due to poor tolerance ( sleepy and reports of draggine RT foot) she was switched by her neurologist Dr. Strickland a week ago to depakote 1000 mg which she was only taking 500 mg daily. Patient started gradually have seizures almost daily over the past week and progressd to around 6-7 seizures today. she was was vitally stable, on admission on RA afebrile.   Her seizures are likely thought to be attributed to recent changes in medications and errors with dosing, given the subtherapeutic level of valproic acid. Negative work up makes other differentials less likely to be the cause including acute CNS infection, electrolyte imbalance or acute intracranial acute process. Patient had 3 episodes of seizures overnight and given ativan x1. Denies injuries from these episodes. Currently stable, alert and oriented x3, no focal neurological deficits on gross examination. AM CBC and BMP wnl.   - Neurology following, recs appreciated   - Routine EEG today     - Consider video EEG if continue to have daily/more frequent seizures (will require transfer to ICU)   - No additional workup indicated if remains seizure free over next 24h  - Loading dose on 1 g valproate in ED.  - Continue PTA depakote 1g PO daily today  - Holding PTA methylphenidate for possibly decreasing seizure threshold.  - Consider PRN ativan for breakthough seizures  - Seizure precautions  - Q 4 neuro checks        Localized Lymphadenopathy  Vocal changes  Patient reports noticing mildly painful lumps in the neck that started few weeks ago, and feeling generally fatigued since COVID a month ago, otherwise a febrile,  no symptoms of cough sore throat shortness of breath night sweats or weight loss.  Physical exam:  small tender lymph nodes distributed along asymmetrically B/L cervical and occipital left and right cervical areas, 1-2 inguinal lymph nodes. Patient notes that her voice has changed - higher pitched, speech clear and comprehensible. Wonder if lymphadenopathy is compressing on laryngeal nerve. Patient has no signs or symptoms suggestive of localized infections or malignancy, so most likely reactive or could be a viral etiology such as HIV, EBV. Recent medication; Less likely malignancy or autoimmune at this point.  -consider further workup of viral causes HIV,or U/S of neck lymph nodes, including LP, if progressing.     Chronic conditions:   ADHD- hold PTA mirtazapine, risperidone, methylphenidate overnight  Will consider restarting in the AM.          Diet: Regular Diet Adult    DVT Prophylaxis: Low Risk/Ambulatory with no VTE prophylaxis indicated  [place] Catheter: Not present  Central Lines: None  Cardiac Monitoring: None  Code Status: Full Code      Disposition Plan    Discharge TBD.      The patient's care was discussed with the Attending Physician, Dr. Valadez.    Steph (Honoree) AILYN Sepulveda        Clinically Significant Risk Factors Present on Admission                       "    ______________________________________________________________________    Interval History     Per nursing staff, patient had 3 episodes of seizures yesterday evening. Mild HA this AM, but denies vision changes, N/V, chest pain, abdominal pain, and SOB. HA is an aura she has before she has seizures. Has some lightheadedness when sitting up. Was able to get some sleep last night, but not a whole lot due to all the noise in the ED. Informed patient about plan for the day and requested we update her parents as well. Mom works from home. Patient well-aware of father's schedule - says best time to reach him is after 12pm since that's when he wakes up for tea and lunch.     Data reviewed today: I reviewed all medications, new labs and imaging results over the last 24 hours. I personally reviewed no images or EKG's today.    Physical Exam   Vital Signs: Temp: 97.9  F (36.6  C) Temp src: Oral BP: 116/69 Pulse: 82   Resp: 18 SpO2: 98 % O2 Device: None (Room air)    Weight: 117 lbs 0 oz  Physical Exam  Constitutional:       General: She is not in acute distress.     Appearance: Normal appearance.      Comments: Conversing normally, high-pitched \"carmina mouse\" voice   HENT:      Head: Normocephalic and atraumatic.   Neck:      Comments: Soft, small, mobile, tender,small  cervical lymphadenopathy noted RT anterior; RT cynthia/postauricular submandibular and left posterior cervical/postauricualr and occipital RT inguinal lymphadnopathy noted 2 small tender lymph nodes 1cm, felt tenderness in the Left no palpable lymph nodes. No palpable axillary or supraclavicular lymph nodes. rvical lymphadenopathy  Cardiovascular:      Rate and Rhythm: Normal rate and regular rhythm.      Pulses: Normal pulses.      Heart sounds: Normal heart sounds. No murmur heard.    No friction rub. No gallop.   Pulmonary:      Effort: Pulmonary effort is normal.      Breath sounds: No stridor. No wheezing, rhonchi or rales.   Abdominal:      General: " Abdomen is flat. There is no distension.      Palpations: Abdomen is soft.   Musculoskeletal:         General: Normal range of motion.      Cervical back: Tenderness present.      Comments: BUE: Strength 5/5, BLE: strength: 5/5   Skin:     General: Skin is warm and dry.   Neurological:      General: No focal deficit present.      Mental Status: She is alert and oriented to person, place, and time.   Psychiatric:         Mood and Affect: Mood normal.         Behavior: Behavior normal.         Data   Recent Labs   Lab 09/08/22  0700 09/07/22  1501   WBC 5.7 6.1   HGB 13.8 13.1   MCV 87 86    200    139   POTASSIUM 4.2 3.8   CHLORIDE 106 105   CO2 24 25   BUN 14 16   CR 0.71 0.66   ANIONGAP 8 9   TRISTAN 9.4 9.3   GLC 75 89   ALBUMIN  --  3.7   PROTTOTAL  --  7.5   BILITOTAL  --  0.7   ALKPHOS  --  77   ALT  --  16   AST  --  18

## 2022-09-08 NOTE — SIGNIFICANT EVENT
House officer notified of 90 second seizure, captured on EEG. No PRNs available. Placed order for PRN ativan to be given for further seizures > 1 minute in length. RN to notify neurology team as an FYI.    Yancy Figueroa MD  [facility], PGY-2  Pager #: [phone]

## 2022-09-08 NOTE — PLAN OF CARE
Goal Outcome Eval  Problem: Dysrhythmia  Goal: Normalized Cardiac Rhythm  Outcome: Ongoing, Progressing   Her heart rate was in the 60s at 2330, dropped to the 50s at 0030,  and then to 46-49 at 02.  Her BP has been stable.  MD notified.

## 2022-09-08 NOTE — PLAN OF CARE
Goal Outcome Evaluation:    PRIMARY DIAGNOSIS: SYNCOPE/TIA vs seizure  OUTPATIENT/OBSERVATION GOALS TO BE MET BEFORE DISCHARGE:  1. Orthostatic performed: No    2. Diagnostic testing complete & at baseline neurologic testing: Yes    3. Cleared by consultants (if involved): No    4. Interpretation of cardiac rhythm per telemetry tech: SB      5. Tolerating adequate PO diet and medications: Yes    6. Return to near baseline physical activity or neurologic status: Yes    Discharge Planner Nurse   Safe discharge environment identified: Yes  Barriers to discharge: Yes       Entered by: Elizabeth Kenyon RN 09/08/2022 12:44 PM     Please review provider order for any additional goals.   Nurse to notify provider when observation goals have been met and patient is ready for discharge.       No seizures this shift, neuros intact. Remains A and O, EEG in process.

## 2022-09-08 NOTE — ED NOTES
Appears to be seizing again. Dr. Cuevas in room. Seizure lasted approx 3 min. immed after seizure, pt was A/O and c/o HA

## 2022-09-08 NOTE — PROGRESS NOTES
Bedside EEG was performed. Wake, drowsy, and sleep were obtained. Patient had an event during the study. There was a head shaking left to right with not responding for questions during the event.   Activations completed were: (eyes open/eyes closed, mental activations, photic)  Activations omitted & reasoning: Hyperventilation omitted due to headaches.  Patient's mental status was: (alert/oriented, confused, sedated, cooperative/uncooperative)  Was the neurologist consulted regarding significant waveforms or interventions needed?   Skin intact? Yes     EEG #   EEG system used: PV02   -  Neurologist dictation to follow.

## 2022-09-08 NOTE — ED NOTES
writer in room. Pt appears to be seizing. Tonic clonic. For approx 1 min. Airway intact. Dr. Fallon in room. Pt awake now and appears to be post ictal

## 2022-09-08 NOTE — PLAN OF CARE
Goal Outcome Evaluation:    Patient AAO. Denies pain or discomfort. No seizure activity noted since admission to floor. Slightly unsteady on feet, assist of one needed.  PRIMARY DIAGNOSIS: Seizure  OUTPATIENT/OBSERVATION GOALS TO BE MET BEFORE DISCHARGE:  ADLs back to baseline: No    Activity and level of assistance: Up with standby assistance.    Pain status: Pain free.    Return to near baseline physical activity: Yes     Discharge Planner Nurse   Safe discharge environment identified: Yes  Barriers to discharge: Yes       Entered by: Eboni Yang RN 09/08/2022 5:28 PM     Please review provider order for any additional goals.   Nurse to notify provider when observation goals have been met and patient is ready for discharge.

## 2022-09-08 NOTE — PLAN OF CARE
Goal Outcome Evaluation:    Patient is alert and oriented this morning. She experienced seizure while having an EEG evaluation done this afternoon at approx. 1300. During the event pulse was recorded at 109, otherwise VSS.     Patient has some current episodes of constipation and is also having menses. Patient ok'd by neurology for SBA ambulation to bathroom.     Patient is able to make needs known to staff and reported feeling homesick this afternoon. She began crying and nursing staff supplied her with coloring materials and contacted her mother to inform patient when she would be visiting.     Patient had to forego lunch, due to EEG procedure.     Patient tele reading was Normal S.R.

## 2022-09-08 NOTE — PROGRESS NOTES
Called by nursing that patient had 90 second seizure like episode.  Describe his eyes closed, with head shaking side-to-side.  Arms were tremoring although not definitively rigid.  State when they held her arms up they state up for period of time.  Shortly after arms held up seizure stopped.  Per EEG tech, there was no postictal state.      Reviewed EEG, and I do not think this represents an epileptic seizure.  There is prominent artifact which I suspect is from the head motion, but the background rhythm does not appear to change significantly in certain leads.  I will asked Dr. Strickland to overread EEG to confirm.  We will also check a lactate.    Do not think Ativan is needed for the spells at this time.  We will confirm with EEG reader.  If felt to be psychogenic, nonepileptic events as expected we will recommend a psychiatry consult.   Also will discuss with Dr. Strickland if long-term video EEG is recommended.  My suspicion is that the majority if not all of these are nonepileptic, but if there is a high suspicion for mixed epileptic seizures may be useful to get a long-term EEG to clarify the burden of the events and abnormal discharges.      Chelsi Jensen, DO  Neurology

## 2022-09-09 VITALS
TEMPERATURE: 98.2 F | SYSTOLIC BLOOD PRESSURE: 115 MMHG | WEIGHT: 108.06 LBS | HEIGHT: 64 IN | DIASTOLIC BLOOD PRESSURE: 72 MMHG | HEART RATE: 74 BPM | OXYGEN SATURATION: 97 % | RESPIRATION RATE: 16 BRPM | BODY MASS INDEX: 18.45 KG/M2

## 2022-09-09 LAB
BASOPHILS # BLD AUTO: 0 10E3/UL (ref 0–0.2)
BASOPHILS NFR BLD AUTO: 0 %
EOSINOPHIL # BLD AUTO: 0 10E3/UL (ref 0–0.7)
EOSINOPHIL NFR BLD AUTO: 1 %
ERYTHROCYTE [DISTWIDTH] IN BLOOD BY AUTOMATED COUNT: 11.6 % (ref 10–15)
HCT VFR BLD AUTO: 39 % (ref 35–47)
HGB BLD-MCNC: 13.3 G/DL (ref 11.7–15.7)
IMM GRANULOCYTES # BLD: 0 10E3/UL
IMM GRANULOCYTES NFR BLD: 0 %
LYMPHOCYTES # BLD AUTO: 1.4 10E3/UL (ref 0.8–5.3)
LYMPHOCYTES NFR BLD AUTO: 25 %
MCH RBC QN AUTO: 29.4 PG (ref 26.5–33)
MCHC RBC AUTO-ENTMCNC: 34.1 G/DL (ref 31.5–36.5)
MCV RBC AUTO: 86 FL (ref 78–100)
MONOCYTES # BLD AUTO: 0.4 10E3/UL (ref 0–1.3)
MONOCYTES NFR BLD AUTO: 6 %
NEUTROPHILS # BLD AUTO: 3.8 10E3/UL (ref 1.6–8.3)
NEUTROPHILS NFR BLD AUTO: 68 %
NRBC # BLD AUTO: 0 10E3/UL
NRBC BLD AUTO-RTO: 0 /100
PLATELET # BLD AUTO: 202 10E3/UL (ref 150–450)
RBC # BLD AUTO: 4.52 10E6/UL (ref 3.8–5.2)
WBC # BLD AUTO: 5.6 10E3/UL (ref 4–11)

## 2022-09-09 PROCEDURE — 99204 OFFICE O/P NEW MOD 45 MIN: CPT | Performed by: NURSE PRACTITIONER

## 2022-09-09 PROCEDURE — 99217 PR OBSERVATION CARE DISCHARGE: CPT | Mod: GC

## 2022-09-09 PROCEDURE — 85025 COMPLETE CBC W/AUTO DIFF WBC: CPT

## 2022-09-09 PROCEDURE — 36415 COLL VENOUS BLD VENIPUNCTURE: CPT

## 2022-09-09 PROCEDURE — G0378 HOSPITAL OBSERVATION PER HR: HCPCS

## 2022-09-09 PROCEDURE — 99215 OFFICE O/P EST HI 40 MIN: CPT | Performed by: PSYCHIATRY & NEUROLOGY

## 2022-09-09 PROCEDURE — 250N000013 HC RX MED GY IP 250 OP 250 PS 637

## 2022-09-09 RX ORDER — DIVALPROEX SODIUM 500 MG/1
1000 TABLET, EXTENDED RELEASE ORAL DAILY
Qty: 60 TABLET | Refills: 1 | Status: SHIPPED | OUTPATIENT
Start: 2022-09-10 | End: 2022-11-15

## 2022-09-09 RX ADMIN — DIVALPROEX SODIUM 1000 MG: 500 TABLET, FILM COATED, EXTENDED RELEASE ORAL at 08:55

## 2022-09-09 ASSESSMENT — ACTIVITIES OF DAILY LIVING (ADL)
ADLS_ACUITY_SCORE: 40
ADLS_ACUITY_SCORE: 39
ADLS_ACUITY_SCORE: 39
ADLS_ACUITY_SCORE: 38
ADLS_ACUITY_SCORE: 40
ADLS_ACUITY_SCORE: 40
ADLS_ACUITY_SCORE: 38
ADLS_ACUITY_SCORE: 40

## 2022-09-09 NOTE — PROGRESS NOTES
PRIMARY DIAGNOSIS: Seizure disorder  OUTPATIENT/OBSERVATION GOALS TO BE MET BEFORE DISCHARGE:          1. Activity and level of assistance: Up with standby assistance.    2. Pain status: Pain free.    3. Return to near baseline physical activity: Yes     Pt has not had any seizure activity.  Seizure pads in place.  She is A/O x 4.  Cardiac rhythm: SR (60's); Anthony (low 40's while sleeping); pt asymptomatic.  Discharge Planner Nurse   Safe discharge environment identified: Yes  Barriers to discharge: Yes, to have a Psych evaluation in the AM.  Neuro managing anticonvulsants.        Entered by: Kalee Pham RN 09/09/2022 1:05 AM     Please review provider order for any additional goals.   Nurse to notify provider when observation goals have been met and patient is ready for discharge.       Entered by: Kalee Pham RN 09/09/2022 5:16 AM     Please review provider order for any additional goals.   Nurse to notify provider when observation goals have been met and patient is ready for discharge.

## 2022-09-09 NOTE — PROGRESS NOTES
PRIMARY DIAGNOSIS: Seizure disorder  OUTPATIENT/OBSERVATION GOALS TO BE MET BEFORE DISCHARGE:  1. ADLs back to baseline: Pt resting, has not gotten out of bed.    2. Activity and level of assistance: Up with standby assistance.    3. Pain status: Pain free.    4. Return to near baseline physical activity: Yes     Pt has not had any seizure activity so far.  Seizure pads in place.  She is A/O x 4; has high pitched voice. Cardiac rhythm: SR (60's); Anthony (mid 40's); pt asymptomatic.  Discharge Planner Nurse   Safe discharge environment identified: Yes  Barriers to discharge: Yes, to have a Psych evaluation in the AM.        Entered by: Kalee Pham RN 09/09/2022 1:05 AM     Please review provider order for any additional goals.   Nurse to notify provider when observation goals have been met and patient is ready for discharge.

## 2022-09-09 NOTE — PROGRESS NOTES
PRIMARY DIAGNOSIS: Seizure disorder  OUTPATIENT/OBSERVATION GOALS TO BE MET BEFORE DISCHARGE:  1. ADLs back to baseline: Pt resting, has not gotten out of bed.    2. Activity and level of assistance: Up with standby assistance.    3. Pain status: Pain free.    4. Return to near baseline physical activity: Yes     Pt has not had any seizure activity so far.  Seizure pads in place.  She is A/O x 4; has high pitched voice. Cardiac rhythm: SR (60's); Anthony (mid 40's); pt asymptomatic.  Discharge Planner Nurse   Safe discharge environment identified: Yes  Barriers to discharge: Yes, to have a Psych evaluation in the AM.        Entered by: Kalee Pham RN 09/08/2022 9:48 PM     Please review provider order for any additional goals.   Nurse to notify provider when observation goals have been met and patient is ready for discharge.

## 2022-09-09 NOTE — CONSULTS
"  INITIAL PSYCHIATRIC CONSULTATION                  REASON FOR REQUEST: Pseudoseizurs, new diagnosis.  Other functional signs on exam including speech and RLE weakness      ASSESSMENT/RECOMMENDATIONS/PLAN :     Anxiety and fear since COVID Seizures.  Mood disorder due to medical condition, hx of complex partial seizures.  Adjustment disorder with anxiety likely due to stressors posed by new diagnoses, hospitalization, functionality changes.        Recommendations:   Ms Young has intermittent fear and anxiety of her new diagnoses however none appearing to affect hospital cares however would benefit from enhanced emotional support and assurance. Focusing on non pharmacological management of anxiety would be the best at this time.   Depakote should help with mood regulation and anxiety also.   Should she experience sleep disruption due to fear and anxiety continuously then consider trial of Melatonin 5 mg at bedtime.   Physical Therapy would be of great help.  Recurring to her structured routine also will help.         MENTAL STATUS EXAMINATION:   General Appearance: Not in acute distress, resting comfortably in bed.    Behavior: Good eye contact, no bizarre ideations  Speech: Coherent.  Thought Process: Linear, clear.  Thought content: No evidence of hallucinations, delusions or paranoia.    Thought Formation: Associations are connected  Judgment: Fair  Insight : Fair at baseline   Attention : Adequate  Memory: Fair  Fund Of Knowledge: Average  Affect: Neutral  Mood: Congruent  Alert : Awake  Suicidal ideation: Absent  Homicidal ideation: Absent  Orientation: X 2-3  Comprehension: Depressed   Generative thought content: Adequate  Language: Intact  Gait and Ambulation: Gait with minimal assist. Ambulation: with assit.    Musculoskeletal: No clear EPS       /72 (BP Location: Right arm)   Pulse 74   Temp 98.2  F (36.8  C) (Oral)   Resp 16   Ht 1.626 m (5' 4\")   Wt 49 kg (108 lb 0.9 oz)   LMP 09/02/2022   " "SpO2 97%   BMI 18.55 kg/m         HISTORY OF PRESENT ILLNESS:   Presenting history to include: [facility]:     Toshia Young is a 32 year old female with PMH fetal alcohol syndrome with developmental delay, ADHD, and recent seizure disorder diagnosis about a month ago; yesterday came in with 6 episodes of seizures earlier today..   Mom reports about a month ago she got covid and recent hospitalization 8/12 for seizure like activity  she was started on keppra 500 2x on 14th after a negative CT brain and an EEG positive for seizure activity. She couldn't tolerate was keppra mom reports she was \"snowed\" drowsy a lot and noticed that she was dragging her right leg and so called neurologist ; ordered MRI that was normal and switched her to Depakote ( valproic acid) 1000 mg per chart. But mom was giving 500mg at night. In the ED  pill bottle clearly said 500 mg daily. Also mom initially held off starting medication right away thought wasn't sure if seizures will return. then last Tue she had  had episode seizure, almost daily afterwards except for a day of two in between.  tient complains of fatigue, and gets headache before the seizures according to mom her seziures were initially short tonic- clonic with periods in between were she regains consciousness is confused for a short time and is back to baseline quickly. Yesterday first few episodes were 3-4 minutes and started.getting longer last one lasted 8 mintues until EMS arrived.   Patient has swollen lymph nodes all over her neck for the past few weeks, tender otherwise no sore throat no fever, night sweats, weight loss, no cough or sob. No rash.      Upon assessment, Ms Young was noted to be pleasant and cooperative, resting comfortably in bed, watching TV.   She acknowledged my presence and engaged in a conversation, which was coherent and clear.  She was aware of her hospital environment and but believed that  precipitating event was \"COIVD\"  " "leading to this hospitalization. She is comfortable that she can return to home , \" I live with my family\" and continue with her regular \" routine\".   She did not report of any hallcuiantions or paranoia. She believed she was well taken care of in hospital and home.Endorsing no thoughts of self harm nor any racing thoughts nor mood swings. Her concerns are limited to \"weakness\".    She talked about COVID, \" I had that in August but I did not need to go to hospital for that\". She is vaccinated and \" Boosted\" but not sure of the new booster, \" my mom and dad make decisions for me\". I assured her that her family was going to do what's best for her.   She acknowledged some \" anxiety \" but nothing bothersome. No changes in her appetite. NO disruption in sleep.       Review of Systems:As per HPI. Remainders of 12 point review of systems negative.  Psychiatric ROS:  Change of Interest/Anhedonia:  No  Appetite/Weight Changes: No  Concentration Changes:No  Impaired Energy:No  Impaired Sleep:No  Anxiety/Panic:No  Tearfulness:No  Depression:No  Psychosis: No  Irritability:No  SI/VI/HI: No,No,No  Laura: No              PFSH reviewed  and not pertinent to chief complaint/reason for visit  /70 (BP Location: Right arm)   Pulse 71   Temp 98.3  F (36.8  C) (Oral)   Resp 16   Ht 1.626 m (5' 4\")   Wt 49 kg (108 lb 0.9 oz)   LMP 09/02/2022   SpO2 96%   BMI 18.55 kg/m    No results found for: AMPHET, PCP, BARBIT, OXYCODONE, THC, ETOH  @24HOURRESULTS@  Recent Results (from the past 72 hour(s))   Extra Blue Top Tube    Collection Time: 09/07/22  3:01 PM   Result Value Ref Range    Hold Specimen [facility]    Extra Red Top Tube    Collection Time: 09/07/22  3:01 PM   Result Value Ref Range    Hold Specimen [facility]    Extra Green Top (Lithium Heparin) Tube    Collection Time: 09/07/22  3:01 PM   Result Value Ref Range    Hold Specimen [facility]    Extra Purple Top Tube    Collection Time: 09/07/22  3:01 PM   Result Value Ref Range    Hold " Specimen [facility]    Comprehensive metabolic panel    Collection Time: 09/07/22  3:01 PM   Result Value Ref Range    Sodium 139 136 - 145 mmol/L    Potassium 3.8 3.5 - 5.0 mmol/L    Chloride 105 98 - 107 mmol/L    Carbon Dioxide (CO2) 25 22 - 31 mmol/L    Anion Gap 9 5 - 18 mmol/L    Urea Nitrogen 16 8 - 22 mg/dL    Creatinine 0.66 0.60 - 1.10 mg/dL    Calcium 9.3 8.5 - 10.5 mg/dL    Glucose 89 70 - 125 mg/dL    Alkaline Phosphatase 77 45 - 120 U/L    AST 18 0 - 40 U/L    ALT 16 0 - 45 U/L    Protein Total 7.5 6.0 - 8.0 g/dL    Albumin 3.7 3.5 - 5.0 g/dL    Bilirubin Total 0.7 0.0 - 1.0 mg/dL    GFR Estimate >90 >60 mL/min/1.73m2   Magnesium    Collection Time: 09/07/22  3:01 PM   Result Value Ref Range    Magnesium 2.0 1.8 - 2.6 mg/dL   Valproic acid (Depakote level)    Collection Time: 09/07/22  3:01 PM   Result Value Ref Range    Valproic acid 48.2   ug/mL   CBC with platelets and differential    Collection Time: 09/07/22  3:01 PM   Result Value Ref Range    WBC Count 6.1 4.0 - 11.0 10e3/uL    RBC Count 4.33 3.80 - 5.20 10e6/uL    Hemoglobin 13.1 11.7 - 15.7 g/dL    Hematocrit 37.3 35.0 - 47.0 %    MCV 86 78 - 100 fL    MCH 30.3 26.5 - 33.0 pg    MCHC 35.1 31.5 - 36.5 g/dL    RDW 11.7 10.0 - 15.0 %    Platelet Count 200 150 - 450 10e3/uL    % Neutrophils 60 %    % Lymphocytes 33 %    % Monocytes 5 %    % Eosinophils 1 %    % Basophils 1 %    % Immature Granulocytes 0 %    NRBCs per 100 WBC 0 <1 /100    Absolute Neutrophils 3.7 1.6 - 8.3 10e3/uL    Absolute Lymphocytes 2.0 0.8 - 5.3 10e3/uL    Absolute Monocytes 0.3 0.0 - 1.3 10e3/uL    Absolute Eosinophils 0.1 0.0 - 0.7 10e3/uL    Absolute Basophils 0.0 0.0 - 0.2 10e3/uL    Absolute Immature Granulocytes 0.0 <=0.4 10e3/uL    Absolute NRBCs 0.0 10e3/uL   Ammonia (on ice)    Collection Time: 09/07/22  4:27 PM   Result Value Ref Range    Ammonia 35 11 - 35 umol/L   Basic metabolic panel    Collection Time: 09/08/22  7:00 AM   Result Value Ref Range    Sodium 138 136 -  145 mmol/L    Potassium 4.2 3.5 - 5.0 mmol/L    Chloride 106 98 - 107 mmol/L    Carbon Dioxide (CO2) 24 22 - 31 mmol/L    Anion Gap 8 5 - 18 mmol/L    Urea Nitrogen 14 8 - 22 mg/dL    Creatinine 0.71 0.60 - 1.10 mg/dL    Calcium 9.4 8.5 - 10.5 mg/dL    Glucose 75 70 - 125 mg/dL    GFR Estimate >90 >60 mL/min/1.73m2   CBC with platelets    Collection Time: 09/08/22  7:00 AM   Result Value Ref Range    WBC Count 5.7 4.0 - 11.0 10e3/uL    RBC Count 4.65 3.80 - 5.20 10e6/uL    Hemoglobin 13.8 11.7 - 15.7 g/dL    Hematocrit 40.4 35.0 - 47.0 %    MCV 87 78 - 100 fL    MCH 29.7 26.5 - 33.0 pg    MCHC 34.2 31.5 - 36.5 g/dL    RDW 11.9 10.0 - 15.0 %    Platelet Count 196 150 - 450 10e3/uL   Ammonia    Collection Time: 09/08/22 11:35 AM   Result Value Ref Range    Ammonia 47 (H) 11 - 35 umol/L       PMH:   Past Medical History:   Diagnosis Date     Fetal alcohol syndrome            Current Medications:Scheduled Meds:    divalproex sodium extended-release  1,000 mg Oral Daily     Continuous Infusions:  PRN Meds:.LORazepam, melatonin, ondansetron **OR** ondansetron, polyethylene glycol, prochlorperazine **OR** prochlorperazine **OR** prochlorperazine, senna-docusate **OR** senna-docusate                Family History: PERSONALLY REVIEWED.History reviewed. No pertinent family history.  Pertinent Family hx not pertinent to Chief Complaint or reason for visit.     Social History:  PERSONALLY REVIEWED.  Social History     Socioeconomic History     Marital status: Single     Spouse name: Not on file     Number of children: Not on file     Years of education: Not on file     Highest education level: Not on file   Occupational History     Not on file   Tobacco Use     Smoking status: Not on file     Smokeless tobacco: Not on file   Substance and Sexual Activity     Alcohol use: Not on file     Drug use: Not on file     Sexual activity: Not on file   Other Topics Concern     Not on file   Social History Narrative     Not on file      Social Determinants of Health     Financial Resource Strain: Not on file   Food Insecurity: Not on file   Transportation Needs: Not on file   Physical Activity: Not on file   Stress: Not on file   Social Connections: Not on file   Intimate Partner Violence: Not on file   Housing Stability: Not on file    not pertinent to Chief Complaint or reason for visit.             Allergies as of 06/01/2014 Reviewed     Review of Systems:As per HPI. Remainders of 12 point review of systems negative.    Review of Pertinent Laboratory:      PERSONALLY REVIEWED.    Physical Exam: Temp:  [97.4  F (36.3  C)-98.5  F (36.9  C)] 98.3  F (36.8  C)  Pulse:  [] 71  Resp:  [12-20] 16  BP: ()/(54-73) 127/70  SpO2:  [96 %-99 %] 96 %   Vitals: reviewed in chart     Physical exam as per medical team: reviewed in chart      diagnoses, risk and benefits of medications discussed with staff. Care coordination with care management team.   Thank you for this consultation.       Debbi Romero; NP  [facility]        This note was created with the help of Dragon dictation system. Grammatical and typing errors are not intentional.

## 2022-09-09 NOTE — PLAN OF CARE
"Goal Outcome Evaluation:    Plan of Care Reviewed With: patient   PRIMARY DIAGNOSIS: \"GENERIC\" NURSING  OUTPATIENT/OBSERVATION GOALS TO BE MET BEFORE DISCHARGE:  ADLs back to baseline: Yes    Activity and level of assistance: Up with standby assistance.    Pain status: Pain free.    Return to near baseline physical activity: Yes     Discharge Planner Nurse   Safe discharge environment identified: Yes  Barriers to discharge:  Recent seizure activity.  No seizure activity observed or reported this shift       Entered by: Son Luz RN 09/09/2022 10:00 AM     Please review provider order for any additional goals.   Nurse to notify provider when observation goals have been met and patient is ready for discharge.       "

## 2022-09-09 NOTE — PROGRESS NOTES
"[facility]  Neurology Consultation - Progress Note    Patient Name:  Toshia Young  Date of Service:  September 9, 2022    Subjective:    No acute events noted overnight.  Did have 1 seizure-like episode on EEG yesterday with features consistent with psychogenic nonepileptic seizures.  There was no change in the EEG background to suggest underlying epilepsy.    Objective:    Vitals: /70 (BP Location: Right arm)   Pulse 71   Temp 98.3  F (36.8  C) (Oral)   Resp 16   Ht 1.626 m (5' 4\")   Wt 49 kg (108 lb 0.9 oz)   LMP 09/02/2022   SpO2 96%   BMI 18.55 kg/m    General: Lying in bed, NAD  Head: Atraumatic,   Cardiac: no lower extremity edema, distal pulses intact   Lungs: Breathing comfortably on room air   Psych: Pleasant   neurologic:  Awake, alert, oriented to person, hospital, month.  Able to follow two-step cross commands.  Speech is fluent and she is able to name and repeat.  Continues to have almost cartoonish but clear speech, conjugate gaze, face symmetric.  Normal bulk, no abnormal movements.  Strength is 5 out of 5 including right lower extremity hip flexion, dorsiflexion today.  Sensation is intact to light touch.    Pertinent Investigations:    I have personally reviewed most recent and pertinent labs, tests, and radiological images.     EEG:     History : Patient is being evaluated for seizures. Medications listed includes Depakote     Description the Recording: This is a multichannel digital EEG recording using the international 10-20 placement system.     Background of the recording consists of well-regulated and well-modulated 9-10 hz activity with an amplitude of 30-50  V.  This alpha activity is bilaterally symmetrical, has an AP gradient and attenuates with alerting procedures.  Photic stimulation performed with eyes open according to the standard protocol, produces minimal change.  Hyperventilation was not performed.  Sleep was not " obtained.     Patient had clinical seizure during the recording during which she was moving her head back-and-forth and although rhythmic activity is noted there is no clear recruitment or post decremental response and appears not epileptogenic.  No other interictal discharges were noted     Impression: This is a normal awake EEG.  Patient had clinical seizure during this recording but no corresponding EEG abnormalities were recorded and therefore was a nonepileptic event.     Classification: Normal awake     Assessment  #Psychogenic non epileptic seizures  #Suspected functional limb weakness  #Abnormal EEG in past  #Developmental dealy  #Fetal alcohol syndrome    Ms. Young is a 32-year-old female with abnormal birth history who presents with concern for seizures.  Events in question were well described by nurses and EEG techs and were captured on EEG.  Described as eyes closed, head shaking side-to-side with limb movements, but not particularly rigid.  EEG during the spell did not show any epileptic correlate.  Mother confirms this is identical to what she was having at home.  Therefore do not think any additional work-up is needed for these events at this time.  She has not had any events in the past 20 hours, and so I do not think long-term video EEG at present would be likely to be fruitful.   Mother does have significant concerns about her new lymphadenopathy.  If this is an infectious or inflammatory process it may be precipitating some of these psychogenic spells.  I will reach out to medicine DR. Valadez to see next steps in work-up.    In addition to her psychogenic events she also has other functional signs including a speech pattern that is not consistent with a known speech disorder.  This is also an acute change when she was ill.  She also has what I suspect is functional limb weakness.  There is prominent giveaway weakness and Valentin sign.  Intermittently the right lower extremity strength is full.   Physical therapy can be quite helpful for functional limb weakness.    Recommendations:   - No further work up at this time for her events.   If clustering can consider long term VEEG but do not feel needed currently.  Would avoid treating with benzodiazpines  - Psychiatry consult ordered  - PT consult ordered  - Consider SLP consult, will defer to medicine inpatient vs outpatient given obs status  - Medicine work up for suspected lymphadenopathy.      Thank you for involving Neurology in the care of Toshia Young.  P    Chelsi Jensen, DO    My total floor time today for this patient was at least 37 minutes, greater than half of which was for counseling and coordination of care

## 2022-09-12 ENCOUNTER — PATIENT OUTREACH (OUTPATIENT)
Dept: CARE COORDINATION | Facility: CLINIC | Age: 32
End: 2022-09-12

## 2022-09-12 NOTE — PROGRESS NOTES
"Clinic Care Coordination Contact  [facility]: Post-Discharge Note  SITUATION                                                      Admission:    Admission Date: 09/07/22      Discharge:   Discharge Date: 09/09/22    BACKGROUND                                                      Per hospital discharge summary and inpatient provider notes:    Toshia Young is a 32 year old female with PMH fetal alcohol syndrome with developmental delay, ADHD, and recent seizure disorder diagnosis about a month ago; yesterday came in with 6 episodes of seizures earlier today..     Mom reports about a month ago she got covid and recent hospitalization 8/12 for seizure like activity  she was started on keppra 500 2x on 14th after a negative CT brain and an EEG positive for seizure activity. She couldn't tolerate was keppra mom reports she was \"snowed\" drowsy a lot and noticed that she was dragging her right leg and so called neurologist ; ordered MRI that was normal and switched her to Depakote ( valproic acid) 1000 mg per chart. But mom was giving 500mg at night. In the ED  pill bottle clearly said 500 mg daily. Also mom initially held off starting medication right away thought wasn't sure if seizures will return. then last Tue she had  had episode seizure, almost daily afterwards except for a day of two in between.      Patient complains of fatigue, and gets headache before the seizures according to mom her seziures were initially short tonic- clonic with periods in between were she regains consciousness is confused for a short time and is back to baseline quickly. Yesterday first few episodes were 3-4 minutes and started.getting longer last one lasted 8 mintues until EMS arrived.     Patient has swollen lymph nodes all over her neck for the past few weeks, tender otherwise no sore throat no fever, night sweats, weight loss, no cough or sob. No rash.    ASSESSMENT           Discharge Assessment  How are you doing now " that you are home?: Patient's mother feels like her daughter was sent home way to soon. Pt's mother said that this is the second time that this has happened and she feels like the doctors did not listen to a thing that she said. Pt's mother said that the only thing they told her was that if this happens again they should call 911 and bring her back to the hospital and she feels like that is not am appropriate medical plan. Pt's mother then hung up the phone.  How are your symptoms? (Red Flag symptoms escalate to triage hotline per guidelines): Unchanged                  PLAN                                                      Outpatient Plan: Follow up with primary care provider within 7 days for hospital follow- up.  Follow up with neurology outpatient.    No future appointments.      For any urgent concerns, please contact our 24 hour nurse triage line: [phone] ([phone])         GUSTAVO Bronson  [phone]  [facility]

## 2022-09-12 NOTE — DISCHARGE SUMMARY
[facility]  Discharge Summary - Medicine & Pediatrics       Date of Admission:  9/7/2022  Date of Discharge:  9/9/2022  4:58 PM  Discharging Provider: CHRISSY Stroud  Discharge Service: Hospitalist Service    Discharge Diagnoses   Seizure disorder (H)  Enlarged lymph node in neck  Change in voice        Follow-ups Needed After Discharge   Follow-up Appointments     Follow-up and recommended labs and tests       Follow up with primary care provider within 7 days for hospital follow-   up.    Follow up with neurology outpatient.  Follow up with psychiatry provider.               Unresulted Labs Ordered in the Past 30 Days of this Admission     No orders found from 8/8/2022 to 9/8/2022.          Discharge Disposition   Discharged to home  Condition at discharge: Stable      Hospital Course   Toshia Young is a 32 year old female with PMH fetal alcohol syndrome with developmental delay, ADHD, and recent diagnosis of seizure disorder 8/12 that was admitted on 9/7/2022.  She presented to the ED with progressively more frequent seizures over the past week and had 6-7 episodes on the day of admission.          Psychogenic non epileptic seizures  Hx of complex partial seizures  Hx of abnormal EEG in the past  Suspected functional limb weankness  Patient recently had an abnormal EEG, negative CT brain; EEG previous admission showed left temporal sharp discharge epileptiform discharge low theres hold for seizure. prior admission approximately 1 month ago. She was subsequently started on keppra and due to poor tolerance ( sleepy and reports of draggine RT foot) she was switched by her neurologist Dr. Strickland a week ago to depakote 1000 mg which she was only taking 500 mg daily. Patient started gradually have seizures almost daily over the past week and progressd to reported 6-7 seizures on day of admission. she was was vitally stable, sating well on RA and afebrile on presentation.  Neurology  consulted, EEG done during a seizure event described by nursing as eyes closed, head shaking side-to-side with limb movements, but not particularly rigid. EEG during spell did not show any epileptic correlate. Her mother confirmed the seizure like activity was identical to what she was having at home.   Neurology with recs; no further work up at this time for her events, if clustering can consider long term VEEG and to avoid treating with benzodiazepines. Psychiatry consulted with recommendation that patient has intermittent fear and anxiety of her new diagnosis and will benefit from enhanced emotional support and assurance. Patient is currently on Depakote which should help with mood regulation and anxiety also.   Should she experience sleep disruption due to fear and anxiety continuously then consider trial of Melatonin 5 mg at bedtime. Physical Therapy would be of great help for suspected functional limb weakness. Recurring to her structured routine also will be help.     - Neurology with recs; No further work up at this time for her events.    - If clustering can consider long term VEEG but do not feel needed currently.   - Avoid treating with benzodiazpines       Localized Lymphadenopathy  Vocal changes  Patient reports noticing mildly painful lumps in the neck that started few weeks ago, and feeling generally fatigued since COVID a month ago, otherwise a febrile,  no symptoms of cough sore throat shortness of breath night sweats or weight loss.  Physical exam:  small tender lymph nodes distributed along asymmetrically B/L cervical and left occipital, few bharti/post auricular 1-2 palpable RT inguinal lymph nodes. Patient notes that her voice has changed - higher pitched, speech clear and comprehensible.  Patient has no signs or symptoms suggestive of localized infections or malignancy, so most likely reactive or could be a viral etiology.  - Consider further workup outpatient of viral causes or U/S of neck lymph  "nodes, including LP, if progressing.       Consultations This Hospital Stay   NEUROLOGY IP CONSULT  PHYSICAL THERAPY ADULT IP CONSULT  PHYSICAL THERAPY ADULT IP CONSULT  PSYCHIATRY IP CONSULT    Code Status   Prior       The patient was discussed with Dr. Burks.    CHRISSY Stroud  [place] [facility] PGY1  [address] [address]  Phone: [phone]  Fax: [phone]  ______________________________________________________________________    Physical Exam    Vital signs:                    Height: 162.6 cm (5' 4\") Weight: 49 kg (108 lb 0.9 oz)  Estimated body mass index is 18.55 kg/m  as calculated from the following:    Height as of this encounter: 1.626 m (5' 4\").    Weight as of this encounter: 49 kg (108 lb 0.9 oz).                   Weight: 108 lbs .9 oz  Constitutional: awake, alert, cooperative, no apparent distress, and appears stated age  HEENT: normocephalic, atraumatic, sclera clear, conjunctiva normal, small soft, mobile, tender lymphadenopathies distributed RT cynthia/postauricular, submandibular, few B/L ant/post cervical and RT inguinal. No palpable supraclavicular, axillary lymphnodes.    Respiratory: No increased work of breathing, good air exchange, clear to auscultation bilaterally, no crackles or wheezing  Cardiovascular regular rate and rhythm, normal S1 and S2,  and no murmur noted.  GI: normal bowel sounds, soft, non-distended, non-tender, no masses palpated.  Musculoskeletal: no lower extremity pitting edema present, full range of motion noted  Neurologic: Awake, alert, oriented to name, place and time. Speech is fluent, has high pitched clear speech. Strength is 5/5 Upper and lower limbs b/L No focal neurological deficits.       Primary Care Physician   Physician No Ref-Primary    Discharge Orders      Speech Therapy Referral      Follow-up and recommended labs and tests     Follow up with " primary care provider within 7 days for hospital follow- up.    Follow up with neurology outpatient.  Follow up with psychiatry provider.     Reason for your hospital stay    You were hospitalized for work up of seizure like activity. Your were seen by the neurologist and EEG was done which indicated that these episodes are likely non epileptic psychogenic seizures. Please follow up with neurology clinic.   Lymphadenopathy. You blood workup showed no indication of infection. Please follow up with you primary care provider for further work up if lymphadenopathy doesn't resolve in 2-4 weeks.     Follow-up and recommended labs and tests     Follow up with primary care provider, Physician No Ref-Primary, within 7 days for hospital follow- up.    Follow up with Neurology outpatient.  Follow up with Psychiatry outpatient.     Activity    Your activity upon discharge: activity as tolerated     Diet    Follow this diet upon discharge: Regular       Significant Results and Procedures   Most Recent 3 CBC's:Recent Labs   Lab Test 09/09/22  1122 09/08/22  0700 09/07/22  1501   WBC 5.6 5.7 6.1   HGB 13.3 13.8 13.1   MCV 86 87 86    196 200     Most Recent 3 BMP's:Recent Labs   Lab Test 09/08/22  0700 09/07/22  1501 08/13/22  0708    139 139   POTASSIUM 4.2 3.8 4.0   CHLORIDE 106 105 108*   CO2 24 25 23   BUN 14 16 11   CR 0.71 0.66 0.79   ANIONGAP 8 9 8   TRISTAN 9.4 9.3 8.9   GLC 75 89 76     Most Recent 3 Hemoglobins:Recent Labs   Lab Test 09/09/22  1122 09/08/22  0700 09/07/22  1501   HGB 13.3 13.8 13.1     7-Day Micro Results     No results found for the last 168 hours.        Most Recent Urinalysis:No lab results found.    Discharge Medications   Discharge Medication List as of 9/9/2022  4:10 PM      CONTINUE these medications which have CHANGED    Details   divalproex sodium extended-release (DEPAKOTE ER) 500 MG 24 hr tablet Take 2 tablets (1,000 mg) by mouth daily, Disp-60 tablet, R-1, Local Print          CONTINUE these medications which have NOT CHANGED    Details   guanFACINE HCl (INTUNIV) 4 MG TB24 Take 4 mg by mouth daily, Historical      methylphenidate (CONCERTA) 36 MG CR tablet Take 72 mg by mouth every morning, Historical      methylphenidate (RITALIN LA) 10 MG 24 hr capsule Take 10 mg by mouth daily (with lunch) At noon, Historical      mirtazapine (REMERON) 15 MG tablet Take 15 mg by mouth nightly as needed, Historical      risperiDONE (RISPERDAL M-TABS) 1 MG ODT Place 1 mg under the tongue daily as needed (agitation), Historical      risperiDONE (RISPERDAL) 2 MG tablet Take 2 mg by mouth 3 times daily, Historical           Allergies   Allergies   Allergen Reactions     Sulfa Drugs Rash

## 2022-11-15 ENCOUNTER — TELEPHONE (OUTPATIENT)
Dept: NEUROLOGY | Facility: CLINIC | Age: 32
End: 2022-11-15

## 2022-11-15 DIAGNOSIS — G40.909 SEIZURE DISORDER (H): ICD-10-CM

## 2022-11-15 RX ORDER — DIVALPROEX SODIUM 500 MG/1
TABLET, EXTENDED RELEASE ORAL
Qty: 60 TABLET | Refills: 0 | Status: SHIPPED | OUTPATIENT
Start: 2022-11-15

## 2022-11-15 NOTE — TELEPHONE ENCOUNTER
[facility] Call Center    Phone Message    May a detailed message be left on voicemail: yes     Reason for Call: Medication Refill Request    Has the patient contacted the pharmacy for the refill? Yes   Name of medication being requested: divalproex sodium extended-release (DEPAKOTE ER) 1000 MG 24 hr tablet  Provider who prescribed the medication: Dr. Strickland  Pharmacy: [facility] [place] [facility].   Date medication is needed: ASAP      Action Taken: Message routed to:  Other: neurology    Travel Screening: Not Applicable

## 2023-06-01 NOTE — ED NOTES
Remains A/O. Mother updated on waiting for a bed. Pt hungry and asking to eat   Itraconazole Counseling:  I discussed with the patient the risks of itraconazole including but not limited to liver damage, nausea/vomiting, neuropathy, and severe allergy.  The patient understands that this medication is best absorbed when taken with acidic beverages such as non-diet cola or ginger ale.  The patient understands that monitoring is required including baseline LFTs and repeat LFTs at intervals.  The patient understands that they are to contact us or the primary physician if concerning signs are noted.

## 2024-04-05 NOTE — PROGRESS NOTES
Bedside EEG was performed that included photic stimulation; hyperventilation was deferred. The patient was awake and drowsy throughout the recording.    [id number] used.   Statement Selected